# Patient Record
Sex: FEMALE | Race: WHITE | NOT HISPANIC OR LATINO | URBAN - METROPOLITAN AREA
[De-identification: names, ages, dates, MRNs, and addresses within clinical notes are randomized per-mention and may not be internally consistent; named-entity substitution may affect disease eponyms.]

---

## 2017-04-13 ENCOUNTER — OFFICE VISIT (OUTPATIENT)
Dept: ENDOCRINOLOGY | Facility: MEDICAL CENTER | Age: 72
End: 2017-04-13
Payer: MEDICARE

## 2017-04-13 VITALS
HEIGHT: 68 IN | DIASTOLIC BLOOD PRESSURE: 68 MMHG | SYSTOLIC BLOOD PRESSURE: 124 MMHG | WEIGHT: 139.6 LBS | BODY MASS INDEX: 21.16 KG/M2

## 2017-04-13 DIAGNOSIS — E78.2 MIXED HYPERLIPIDEMIA: ICD-10-CM

## 2017-04-13 DIAGNOSIS — E55.9 VITAMIN D INSUFFICIENCY: ICD-10-CM

## 2017-04-13 DIAGNOSIS — I10 ESSENTIAL HYPERTENSION: ICD-10-CM

## 2017-04-13 DIAGNOSIS — E10.65 TYPE 1 DIABETES MELLITUS WITH HYPERGLYCEMIA (HCC): ICD-10-CM

## 2017-04-13 LAB
HBA1C MFR BLD: 7.5 % (ref ?–5.8)
INT CON NEG: NORMAL
INT CON POS: NORMAL

## 2017-04-13 PROCEDURE — 99214 OFFICE O/P EST MOD 30 MIN: CPT | Performed by: INTERNAL MEDICINE

## 2017-04-13 PROCEDURE — 4040F PNEUMOC VAC/ADMIN/RCVD: CPT | Mod: 8P | Performed by: INTERNAL MEDICINE

## 2017-04-13 PROCEDURE — G8420 CALC BMI NORM PARAMETERS: HCPCS | Performed by: INTERNAL MEDICINE

## 2017-04-13 PROCEDURE — 1036F TOBACCO NON-USER: CPT | Performed by: INTERNAL MEDICINE

## 2017-04-13 PROCEDURE — 3014F SCREEN MAMMO DOC REV: CPT | Mod: 8P | Performed by: INTERNAL MEDICINE

## 2017-04-13 PROCEDURE — 1101F PT FALLS ASSESS-DOCD LE1/YR: CPT | Mod: 8P | Performed by: INTERNAL MEDICINE

## 2017-04-13 PROCEDURE — 3045F PR MOST RECENT HEMOGLOBIN A1C LEVEL 7.0-9.0%: CPT | Performed by: INTERNAL MEDICINE

## 2017-04-13 PROCEDURE — 83036 HEMOGLOBIN GLYCOSYLATED A1C: CPT | Performed by: INTERNAL MEDICINE

## 2017-04-13 PROCEDURE — 3017F COLORECTAL CA SCREEN DOC REV: CPT | Mod: 8P | Performed by: INTERNAL MEDICINE

## 2017-04-13 NOTE — MR AVS SNAPSHOT
"        Kymberly Vuong   2017 12:40 PM   Office Visit   MRN: [id number]    Department:  Endocrinology [facility]   Dept Phone:  [phone]    Description:  Female : 1945   Provider:  Gloria Curran M.D.; ENDOCRINOLOGY DIABETES RN           Reason for Visit     Insulin Dependent Diabetes follow up appt.       Allergies as of 2017     No Known Allergies      You were diagnosed with     Type 1 diabetes mellitus with hyperglycemia (CMS-HCC)   [835372]       Mixed hyperlipidemia   [272.2.ICD-9-CM]       Essential hypertension   [8496271]       Vitamin D insufficiency   [957855]         Vital Signs     Blood Pressure Height Weight Body Mass Index Smoking Status       124/68 mmHg 1.715 m (5' 7.5\") 63.322 kg (139 lb 9.6 oz) 21.53 kg/m2 Never Smoker        Basic Information     Date Of Birth Sex Race Ethnicity Preferred Language    1945 Female White Non- English      Your appointments     Aug 24, 2017 12:40 PM   Diabetes Care Visit with Gloria Curran M.D., ENDOCRINOLOGY DIABETES RN   [facility] Endocrin[facility]    [address] [place] [address], [address]  [facility] [address] [phone]           You will be receiving a confirmation call a few days before your appointment from our automated call confirmation system.              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.9   2014 - Present    Encounter for long-term (current) use of insulin (CMS-HCC) Z79.4   2015 - Present    Vitamin D insufficiency E55.9   2016 - Present      Health Maintenance        Date Due Completion Dates    IMM DTaP/Tdap/Td Vaccine (1 - Tdap) 1964 ---    PAP SMEAR 1966 ---    COLONOSCOPY 1995 ---    IMM ZOSTER VACCINE 2005 " ---    IMM PNEUMOCOCCAL 65+ (ADULT) LOW/MEDIUM RISK SERIES (1 of 2 - PCV13) 6/21/2010 ---    MAMMOGRAM 8/8/2014 8/8/2013, 7/31/2013, 7/30/2012, 7/29/2011, 7/28/2010, 7/28/2010, 4/15/2009, 3/25/2009, 3/25/2009    DIABETES MONOFILAMENT / LE EXAM 12/29/2016 12/29/2015 (Done), 8/11/2015 (Done), 8/7/2014, 8/7/2014 (N/S)    Override on 12/29/2015: Done    Override on 8/11/2015: Done    Override on 8/7/2014: (N/S)    RETINAL SCREENING 1/11/2017 1/11/2016, 1/11/2016 (Done), 4/20/2015, 5/15/2014, 7/22/2013, 10/22/2012    Override on 1/11/2016: Done    A1C SCREENING 1/26/2017 7/26/2016, 12/29/2015, 2/11/2015, 8/7/2014, 2/6/2014    FASTING LIPID PROFILE 7/26/2017 7/26/2016, 2/24/2015, 2/18/2014, 6/7/2013    URINE ACR / MICROALBUMIN 7/26/2017 7/26/2016, 2/24/2015, 2/18/2014, 6/7/2013    SERUM CREATININE 7/26/2017 7/26/2016, 2/24/2015, 2/18/2014, 6/7/2013, 4/25/2007, 3/5/2007    BONE DENSITY 7/30/2017 7/30/2012, 7/28/2010            Results     POCT Hemoglobin A1C      Component    Glycohemoglobin    7.5    Comment:     lot [id number]  11/18    Internal Control Negative    Internal Control Positive                        Current Immunizations     No immunizations on file.      Below and/or attached are the medications your provider expects you to take. Review all of your home medications and newly ordered medications with your provider and/or pharmacist. Follow medication instructions as directed by your provider and/or pharmacist. Please keep your medication list with you and share with your provider. Update the information when medications are discontinued, doses are changed, or new medications (including over-the-counter products) are added; and carry medication information at all times in the event of emergency situations     Allergies:  No Known Allergies          Medications  Valid as of: April 13, 2017 -  1:04 PM    Generic Name Brand Name Tablet Size Instructions for use    Aspirin (Tab)  MG Take 325 mg by mouth  every 6 hours as needed.        Calcium Citrate-Vitamin D   Take  by mouth.        Insulin Glargine (Solution Pen-injector) LANTUS 100 UNIT/ML Inject 25 Units as instructed every evening.        Insulin Lispro (Solution Pen-injector) HUMALOG 100 UNIT/ML Inject 5-7 Units as instructed 3 times a day before meals.        Insulin Pen Needle (Misc) Pen Needles 31G X 6 MM Using 4 pen needles per day with insulin injection        Losartan Potassium (Tab) COZAAR 50 MG Take 50 mg by mouth every day.        Misc Natural Products   Take 1 Tab by mouth every day at 6 PM.        Multiple Vitamins-Minerals (Tab) THERAGRAN-M  Take 1 Tab by mouth every day.        Omega-3 Fatty Acids (Cap) OMEGA 3 FA 1000 MG Take 1,000 mg by mouth every day at 6 PM.        Simvastatin (Tab) ZOCOR 20 MG Take 20 mg by mouth every evening.        .                 Medicines prescribed today were sent to:     [id number] - [place], [place] - [address] [place] [address]    Phone: [phone] Fax: [phone]    Open 24 Hours?: No      Medication refill instructions:       If your prescription bottle indicates you have medication refills left, it is not necessary to call your provider’s office. Please contact your pharmacy and they will refill your medication.    If your prescription bottle indicates you do not have any refills left, you may request refills at any time through one of the following ways: The online [employer] system (except Urgent Care), by calling your provider’s office, or by asking your pharmacy to contact your provider’s office with a refill request. Medication refills are processed only during regular business hours and may not be available until the next business day. Your provider may request additional information or to have a follow-up visit with you prior to refilling your medication.   *Please Note: Medication refills are assigned a new Rx number when refilled electronically. Your pharmacy may  indicate that no refills were authorized even though a new prescription for the same medication is available at the pharmacy. Please request the medicine by name with the pharmacy before contacting your provider for a refill.        Your To Do List     Future Labs/Procedures Complete By Expires    COMP METABOLIC PANEL  As directed 4/14/2018    LIPID PROFILE  As directed 4/14/2018    MICROALBUMIN CREAT RATIO URINE  As directed 4/14/2018    VITAMIN D,25 HYDROXY  As directed 4/14/2018         MyChart Access Code: Activation code not generated  Current MyChart Status: Active

## 2017-04-13 NOTE — PROGRESS NOTES
Endocrinology Clinic Progress Note  PCP: Tamie Siddiqi M.D.    CC: Type 1 diabetes     HPI:  Kymberly Vuong is a 71 y.o. old patient who comes in today for routine follow up. Patient is new to me today, previously saw Dr. Moore.    Type 1 diabetes: She was diagnosed with type 1 diabetes at age 24 years. She has been on insulin since then. Currently on Lantus 24 units every morning and Humalog 5-7 units 3 times a day with meals. She checks blood sugars 3-4 times a day. Fasting blood sugar in the morning is mostly in the range of 110-140. No hypoglycemia. Pre-meal blood sugar readings are well controlled, most readings are below 150. She denies any significant issues with numbness and tingling in feet. Last eye exam was done 2 months ago, she does have a history of diabetic retinopathy.    Hypertension: Blood pressure is well controlled. She is on ARB.    Hyperlipidemia: She is currently on simvastatin. No history of coronary artery disease.    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 (Patient taking differently: Inject 24-25 Units as instructed every evening.), Disp: 30 mL, Rfl: 3  •  insulin lispro, Human, (HUMALOG) 100 UNIT/ML Solution Pen-injector injection, Inject  5-7 Units as instructed 3 times a day before meals., Disp: , Rfl:   •  losartan (COZAAR) 50 MG Tab, Take 50 mg by mouth every day., Disp: , Rfl:   •  simvastatin (ZOCOR) 20 MG TABS, Take 20 mg by mouth every evening., Disp: , Rfl:   •  aspirin (ASA) 325 MG TABS, Take 325 mg by mouth every 6 hours as needed., Disp: , Rfl:   •  therapeutic multivitamin-minerals (THERAGRAN-M) TABS, Take 1 Tab by mouth every day., Disp: , Rfl:   •  Misc Natural Products (GLUCOSAMINE CHOND COMPLEX/MSM PO), Take 1 Tab by mouth every day at 6 PM., Disp: , Rfl:   •  docosahexanoic acid (OMEGA 3 FA) 1000 MG CAPS, Take 1,000 mg by mouth every day at 6 PM., Disp: , Rfl:   •  Calcium Carbonate-Vitamin D (CALCIUM + D PO), Take  by mouth., Disp: , Rfl:     Labs:   Results for LIA HAMMONDS (MRN [id number]) as of 4/13/2017 12:54   Ref. Range 7/26/2016 07:53 4/13/2017 12:50   Sodium Latest Ref Range: 134-144 mmol/L 141    Potassium Latest Ref Range: 3.5-5.2 mmol/L 4.5    Chloride Latest Ref Range:  mmol/L 100    Co2 Latest Ref Range: 18-29 mmol/L 25    Glucose Latest Ref Range: 65-99 mg/dL 123 (H)    Bun Latest Ref Range: 8-27 mg/dL 15    Creatinine Latest Ref Range: 0.57-1.00 mg/dL 0.67    GFR If  Latest Ref Range: >59 mL/min/1.73 102    GFR If Non  Latest Ref Range: >59 mL/min/1.73 89    Bun-Creatinine Ratio Latest Ref Range: 11-26  22    Calcium Latest Ref Range: 8.7-10.3 mg/dL 8.7    AST(SGOT) Latest Ref Range: 0-40 IU/L 25    ALT(SGPT) Latest Ref Range: 0-32 IU/L 20    Alkaline Phosphatase Latest Ref Range:  IU/L 76    Total Bilirubin Latest Ref Range: 0.0-1.2 mg/dL 0.4    Albumin Latest Ref Range: 3.5-4.8 g/dL 3.8    Total Protein Latest Ref Range: 6.0-8.5 g/dL 6.5    Globulin Latest Ref Range: 1.5-4.5 g/dL 2.7    A-G Ratio Latest Ref Range: 1.1-2.5  1.4    Glycohemoglobin Unknown 8.6 (H) 7.5   Cholesterol,Tot Latest Ref Range: 100-199 mg/dL 168    Triglycerides Latest Ref Range: 0-149 mg/dL 59   "  HDL Latest Ref Range: >39 mg/dL 69    LDL Latest Ref Range: 0-99 mg/dL 87    VLDL Cholesterol Calc Latest Ref Range: 5-40 mg/dL 12    Comment: Unknown CANCELED    Creatinine, Random Urine Latest Ref Range: Not Estab. mg/dL 113.1    Microalbumin, Urine Random Latest Ref Range: Not Estab. ug/mL 7.6    Microalbumin-Creatinine Latest Ref Range: 0.0-30.0 mg/g creat 6.7      Physical Examination:  Vital signs: /68 mmHg  Ht 1.715 m (5' 7.5\")  Wt 63.322 kg (139 lb 9.6 oz)  BMI 21.53 kg/m2  General: No apparent distress, cooperative  Eyes: No scleral icterus, no discharge, normal eyelids  Neck: No abnormal masses on inspection   Resp: Normal effort, clear to auscultation bilaterally  CVS: Regular rate and rhythm, S1 S2 normal, no murmur  Extremities: No lower extremity edema  Musculoskeletal: Normal digits and nails  Skin: No rash on visible skin  Psych: Alert and oriented, normal mood and affect    Assessment and Plan:    1. Type 1 diabetes mellitus with hyperglycemia (CMS-HCC)  · Hemoglobin A1c today in the clinic is 7.5%  · Goal hemoglobin A1c less than 7.5%  · Continue Lantus 24 units every morning  · Continue Humalog 5-7 units 3 times a day with meals, no changes to medications today  · Repeat labs:  - POCT Hemoglobin A1C  - COMP METABOLIC PANEL; Future  - LIPID PROFILE; Future  - MICROALBUMIN CREAT RATIO URINE; Future    2. Mixed hyperlipidemia  · We will repeat lipid profile  · Continue simvastatin    3. Essential hypertension  · Blood pressure is well controlled  · Continue losartan    4. Vitamin D insufficiency  · Repeat labs:  - VITAMIN D,25 HYDROXY; Future    Return in about 4 months (around 8/13/2017).    Thank you for allowing me to participate in the care of this patient.    Gloria Curran M.D.    CC:   Tamie Siddiqi M.D.    This note was created using voice recognition software (Dragon). The accuracy of the dictation is limited by the abilities of the software. I have reviewed the note prior to " signing, however some errors in grammar and context are still possible. If you have any questions related to this note please do not hesitate to contact our office.

## 2017-04-13 NOTE — Clinical Note
Authorization for Release/Disclosure of Protected Health Information   Name: Kymberly Vuong  : 1945  SSN: [id number]   Address: [place] [address]  Phone:    [phone] (home)     I authorize the entity listed below to release/disclose the PHI below to [facility]/   Liberty Curran MD   Provider or Entity Name:    Dr. Benoit   Address   [facility]   Phone:      Fax:  [phone]     Reason for request: continuity of care   Information to be released:    [  ] Release all info [  ] LAST DEXA   [ xxx ] RETINA EXAM REPORT    [  ] Last Labs      [  ] Check here and initial the line next to each item to release ALL health information INCLUDING  _____ Care and treatment for drug and / or alcohol abuse  _____ HIV testing, infection status, or AIDS  _____ Genetic Testing    DATES OF SERVICE OR TIME PERIOD TO BE DISCLOSED: _2 years __  I understand and acknowledge that:  * This Authorization may be revoked at any time by you in writing, except if your health information has already been used or disclosed.  * Your health information that will be used or disclosed as a result of you signing this authorization could be re-disclosed by the recipient. If this occurs, your re-disclosed health information may no longer be protected by State or Federal laws.  * You may refuse to sign this Authorization. Your refusal will not affect your ability to obtain treatment.  * This Authorization becomes effective upon signing and will  on (date) __________. If no date is indicated, this Authorization will  one (1) year from the signature date.    Name: Kymberly Vuong     Signature:     Date: 2017

## 2017-04-13 NOTE — PROGRESS NOTES
Patient with existing type diabetes:  Type 1 diabetes here for follow up.       Patient's health status since last visit: overall healthy, did catch on virus when on vacation.   Issues with diabetes since last visit none.   Current Diabetes Medications: Lantus 24-25 units at hs and Humalog 5-7 units with meals.     HbA1c: @hba1c@  Lab Results   Component Value Date/Time    GLYCOHEMOGLOBIN 7.5 04/13/2017 12:50 PM        FSBS  Testing: checking 3 times per day.     Hypoglycemia: occasional lows, usually associated with activity.   Exercise: swimming 3 times a week, walking daily .     Retinal Exam:current. Dr. Benoit.     Daily Foot Exam: checks feet daily.     Routine Dental Exams: current.

## 2017-06-19 DIAGNOSIS — E10.65 TYPE 1 DIABETES MELLITUS WITH HYPERGLYCEMIA (HCC): ICD-10-CM

## 2017-08-24 ENCOUNTER — OFFICE VISIT (OUTPATIENT)
Dept: ENDOCRINOLOGY | Facility: MEDICAL CENTER | Age: 72
End: 2017-08-24
Payer: MEDICARE

## 2017-08-24 VITALS
HEIGHT: 68 IN | BODY MASS INDEX: 20.92 KG/M2 | SYSTOLIC BLOOD PRESSURE: 124 MMHG | WEIGHT: 138 LBS | DIASTOLIC BLOOD PRESSURE: 72 MMHG

## 2017-08-24 DIAGNOSIS — E78.2 MIXED HYPERLIPIDEMIA: ICD-10-CM

## 2017-08-24 DIAGNOSIS — I10 ESSENTIAL HYPERTENSION: ICD-10-CM

## 2017-08-24 LAB
HBA1C MFR BLD: 7.7 % (ref ?–5.8)
INT CON NEG: NORMAL
INT CON POS: NORMAL

## 2017-08-24 PROCEDURE — 83036 HEMOGLOBIN GLYCOSYLATED A1C: CPT | Performed by: INTERNAL MEDICINE

## 2017-08-24 PROCEDURE — 99214 OFFICE O/P EST MOD 30 MIN: CPT | Performed by: INTERNAL MEDICINE

## 2017-08-24 NOTE — PROGRESS NOTES
Endocrinology Clinic Progress Note  PCP: Tamie Siddiqi M.D.    CC: Type 1 diabetes     HPI:  Kymberly Vuong is a 71 y.o. old patient who comes in today for routine follow up. Patient is new to me today, previously saw Dr. Moore.    Type 1 diabetes: She is currently on Lantus 24 units every morning and Humalog 5-7 units 3 times a day with meals. She checks blood sugars 3-4 times a day. Fasting blood sugar in the morning is mostly in the range of 100-130. No hypoglycemia. Pre-meal blood sugar readings are well controlled, most readings are below 160. She denies any significant issues with numbness and tingling in feet. She is up-to-date with eye exam.    Hypertension: Blood pressure is well controlled. She is on ARB.    Hyperlipidemia: She is currently on simvastatin. No history of coronary artery disease.    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 (Patient taking differently: Inject 5-6 Units as instructed 3 times a day before meals.), Disp: 30 mL, Rfl: 6  •  Insulin Pen Needle (PEN NEEDLES) 31G X 6 MM Misc, Using 4 pen needles per day with insulin injection, Disp: 100 Each, Rfl: 6  •  insulin glargine (LANTUS SOLOSTAR) 100 UNIT/ML Solution Pen-injector injection, Inject 25 Units as instructed every evening. (Patient taking differently: Inject 24-25 Units as instructed every  evening.), Disp: 30 mL, Rfl: 3  •  losartan (COZAAR) 50 MG Tab, Take 50 mg by mouth every day., Disp: , Rfl:   •  simvastatin (ZOCOR) 20 MG TABS, Take 20 mg by mouth every evening., Disp: , Rfl:   •  aspirin (ASA) 325 MG TABS, Take 325 mg by mouth every 6 hours as needed., Disp: , Rfl:   •  therapeutic multivitamin-minerals (THERAGRAN-M) TABS, Take 1 Tab by mouth every day., Disp: , Rfl:   •  Misc Natural Products (GLUCOSAMINE CHOND COMPLEX/MSM PO), Take 1 Tab by mouth every day at 6 PM., Disp: , Rfl:   •  docosahexanoic acid (OMEGA 3 FA) 1000 MG CAPS, Take 1,000 mg by mouth every day at 6 PM., Disp: , Rfl:   •  Calcium Carbonate-Vitamin D (CALCIUM + D PO), Take  by mouth., Disp: , Rfl:     Labs:   Results for LIA HAMMONDS (MRN [id number]) as of 8/24/2017 12:36   Ref. Range 4/13/2017 12:50 6/12/2017 08:41 8/24/2017 12:33   Sodium Latest Ref Range: 134-144 mmol/L  142    Potassium Latest Ref Range: 3.5-5.2 mmol/L  4.5    Chloride Latest Ref Range:  mmol/L  101    Co2 Latest Ref Range: 18-29 mmol/L  26    Glucose Latest Ref Range: 65-99 mg/dL  115 (H)    Bun Latest Ref Range: 8-27 mg/dL  15    Creatinine Latest Ref Range: 0.57-1.00 mg/dL  0.55 (L)    GFR If  Latest Ref Range: >59 mL/min/1.73  109    GFR If Non  Latest Ref Range: >59 mL/min/1.73  95    Bun-Creatinine Ratio Latest Ref Range: 12-28   27    Calcium Latest Ref Range: 8.7-10.3 mg/dL  9.0    AST(SGOT) Latest Ref Range: 0-40 IU/L  23    ALT(SGPT) Latest Ref Range: 0-32 IU/L  19    Alkaline Phosphatase Latest Ref Range:  IU/L  80    Total Bilirubin Latest Ref Range: 0.0-1.2 mg/dL  0.5    Albumin Latest Ref Range: 3.5-4.8 g/dL  3.9    Total Protein Latest Ref Range: 6.0-8.5 g/dL  6.6    Globulin Latest Ref Range: 1.5-4.5 g/dL  2.7    A-G Ratio Latest Ref Range: 1.2-2.2   1.4    Glycohemoglobin Unknown 7.5  7.7   Cholesterol,Tot Latest Ref Range: 100-199 mg/dL  181    Triglycerides Latest Ref Range: 0-149 mg/dL  77   "  HDL Latest Ref Range: >39 mg/dL  70    LDL Latest Ref Range: 0-99 mg/dL  96    VLDL Cholesterol Calc Latest Ref Range: 5-40 mg/dL  15    Comment: Unknown  CANCELED    Creatinine, Random Urine Latest Ref Range: Not Estab. mg/dL  73.0    Microalbumin, Urine Random Latest Ref Range: Not Estab. ug/mL  6.2    Microalbumin-Creatinine Latest Ref Range: 0.0-30.0 mg/g creat  8.5    25-Hydroxy   Vitamin D 25 Latest Ref Range: 30.0-100.0 ng/mL  39.0        Physical Examination:  Vital signs: /72 mmHg  Ht 1.715 m (5' 7.5\")  Wt 62.596 kg (138 lb)  BMI 21.28 kg/m2  General: No apparent distress, cooperative  Eyes: No scleral icterus, no discharge, normal eyelids  Neck: No abnormal masses on inspection   Resp: Normal effort, clear to auscultation bilaterally  CVS: Regular rate and rhythm, S1 S2 normal, no murmur  Extremities: No lower extremity edema  Musculoskeletal: Normal digits and nails  Skin: No rash on visible skin  Psych: Alert and oriented, normal mood and affect    Assessment and Plan:    1. Type 1 diabetes mellitus with hyperglycemia (CMS-HCC)  · Hemoglobin A1c today in the clinic is 7.7%  · Goal hemoglobin A1c less than 7.5%  · Continue Lantus 24 units every morning  · Continue Humalog 5-7 units 3 times a day with meals, no changes to medications today    2. Mixed hyperlipidemia  · LDL 96  · Continue simvastatin    3. Essential hypertension  · Blood pressure is well controlled  · Continue losartan    Return in about 3 months (around 11/24/2017).    Thank you for allowing me to participate in the care of this patient.    Gloria Curran M.D.    CC:   Tamie Siddiqi M.D.    This note was created using voice recognition software (Dragon). The accuracy of the dictation is limited by the abilities of the software. I have reviewed the note prior to signing, however some errors in grammar and context are still possible. If you have any questions related to this note please do not hesitate to contact our office. "

## 2017-08-24 NOTE — PROGRESS NOTES
Patient with existing type diabetes:  Type 1 diabetes here for follow up     Patient's health status since last visit: having an issue with her eye (blurry vision), this apparently came on suddenly.  Saw opthalmologist and they could not find anything wrong.   Issues with diabetes since last visit none  Current Diabetes Medications: Lantus 24-26 units at hs and Humalog 5-6 units with meals.     HbA1c: @hba1c@  Lab Results   Component Value Date/Time    GLYCOHEMOGLOBIN 7.5 04/13/2017 12:50 PM        FSBS  Testing: testing 4 times per day    Hypoglycemia: occasional  Exercise: has been doing some chair yoga and walking daily.   Retinal Exam:current.     Daily Foot Exam: denies problems.

## 2017-08-24 NOTE — MR AVS SNAPSHOT
"        Kymberly Vuong   2017 12:20 PM   Office Visit   MRN: [id number]    Department:  Endocrinology Med [facility]   Dept Phone:  [phone]    Description:  Female : 1945   Provider:  Dior Culp R.N.; Gloria Curran M.D.           Reason for Visit     Insulin Dependent Diabetes follow up      Allergies as of 2017     No Known Allergies      You were diagnosed with     Uncontrolled type 1 diabetes mellitus with complication (CMS-HCC)   [3836796]       Mixed hyperlipidemia   [272.2.ICD-9-CM]       Essential hypertension   [3498330]         Vital Signs     Blood Pressure Height Weight Body Mass Index Smoking Status       124/72 mmHg 1.715 m (5' 7.5\") 62.596 kg (138 lb) 21.28 kg/m2 Never Smoker        Basic Information     Date Of Birth Sex Race Ethnicity Preferred Language    1945 Female White Non- English      Your appointments     Dec 07, 2017 12:40 PM   Diabetes Care Visit with Gloria Curran M.D., Dior Culp R.N.   [facility]    [address], [address]  [place] [place] [address] [phone]           You will be receiving a confirmation call a few days before your appointment from our automated call confirmation system.              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.9   2014 - Present    Encounter for long-term (current) use of insulin (CMS-HCC) Z79.4   2015 - Present    Vitamin D insufficiency E55.9   2016 - Present      Health Maintenance        Date Due Completion Dates    IMM DTaP/Tdap/Td Vaccine (1 - Tdap) 1964 ---    PAP SMEAR 1966 ---    COLONOSCOPY 1995 ---    IMM ZOSTER VACCINE 2005 ---    IMM PNEUMOCOCCAL 65+ (ADULT) LOW/MEDIUM RISK " SERIES (1 of 2 - PCV13) 6/21/2010 ---    MAMMOGRAM 8/8/2014 8/8/2013, 7/31/2013, 7/30/2012, 7/29/2011, 7/28/2010, 7/28/2010, 4/15/2009, 3/25/2009, 3/25/2009    DIABETES MONOFILAMENT / LE EXAM 12/29/2016 12/29/2015 (Done), 8/11/2015 (Done), 8/7/2014, 8/7/2014 (N/S)    Override on 12/29/2015: Done    Override on 8/11/2015: Done    Override on 8/7/2014: (N/S)    BONE DENSITY 7/30/2017 7/30/2012, 7/28/2010    IMM INFLUENZA (1) 9/1/2017 ---    A1C SCREENING 10/13/2017 4/13/2017, 7/26/2016, 12/29/2015, 2/11/2015, 8/7/2014, 2/6/2014    RETINAL SCREENING 3/20/2018 3/20/2017 (Done), 1/11/2016, 1/11/2016 (Done), 4/20/2015, 5/15/2014, 7/22/2013, 10/22/2012    Override on 3/20/2017: Done    Override on 1/11/2016: Done    FASTING LIPID PROFILE 6/12/2018 6/12/2017, 7/26/2016, 2/24/2015, 2/18/2014, 6/7/2013    URINE ACR / MICROALBUMIN 6/12/2018 6/12/2017, 7/26/2016, 2/24/2015, 2/18/2014, 6/7/2013    SERUM CREATININE 6/12/2018 6/12/2017, 7/26/2016, 2/24/2015, 2/18/2014, 6/7/2013, 4/25/2007, 3/5/2007            Results     POCT Hemoglobin A1C      Component    Glycohemoglobin    7.7    Comment:     lot [id number]  4/19    Internal Control Negative    Internal Control Positive                        Current Immunizations     No immunizations on file.      Below and/or attached are the medications your provider expects you to take. Review all of your home medications and newly ordered medications with your provider and/or pharmacist. Follow medication instructions as directed by your provider and/or pharmacist. Please keep your medication list with you and share with your provider. Update the information when medications are discontinued, doses are changed, or new medications (including over-the-counter products) are added; and carry medication information at all times in the event of emergency situations     Allergies:  No Known Allergies          Medications  Valid as of: August 24, 2017 - 12:46 PM    Generic Name Brand Name Tablet  Size Instructions for use    Aspirin (Tab)  MG Take 325 mg by mouth every 6 hours as needed.        Calcium Citrate-Vitamin D   Take  by mouth.        Insulin Glargine (Solution Pen-injector) LANTUS 100 UNIT/ML Inject 25 Units as instructed every evening.        Insulin Lispro (Solution Pen-injector) HUMALOG 100 UNIT/ML Inject 10 Units as instructed 3 times a day before meals.        Insulin Pen Needle (Misc) Pen Needles 31G X 6 MM Using 4 pen needles per day with insulin injection        Losartan Potassium (Tab) COZAAR 50 MG Take 50 mg by mouth every day.        Misc Natural Products   Take 1 Tab by mouth every day at 6 PM.        Multiple Vitamins-Minerals (Tab) THERAGRAN-M  Take 1 Tab by mouth every day.        Omega-3 Fatty Acids (Cap) OMEGA 3 FA 1000 MG Take 1,000 mg by mouth every day at 6 PM.        Simvastatin (Tab) ZOCOR 20 MG Take 20 mg by mouth every evening.        .                 Medicines prescribed today were sent to:     [id number] - [place], [place] - [address] [address]    Phone: [phone] Fax: [phone]    Open 24 Hours?: No      Medication refill instructions:       If your prescription bottle indicates you have medication refills left, it is not necessary to call your provider’s office. Please contact your pharmacy and they will refill your medication.    If your prescription bottle indicates you do not have any refills left, you may request refills at any time through one of the following ways: The online [employer] system (except Urgent Care), by calling your provider’s office, or by asking your pharmacy to contact your provider’s office with a refill request. Medication refills are processed only during regular business hours and may not be available until the next business day. Your provider may request additional information or to have a follow-up visit with you prior to refilling your medication.   *Please Note: Medication refills are  assigned a new Rx number when refilled electronically. Your pharmacy may indicate that no refills were authorized even though a new prescription for the same medication is available at the pharmacy. Please request the medicine by name with the pharmacy before contacting your provider for a refill.           MyChart Access Code: Activation code not generated  Current [employer] Status: Active

## 2017-12-07 ENCOUNTER — APPOINTMENT (OUTPATIENT)
Dept: ENDOCRINOLOGY | Facility: MEDICAL CENTER | Age: 72
End: 2017-12-07
Payer: MEDICARE

## 2018-01-12 ENCOUNTER — OFFICE VISIT (OUTPATIENT)
Dept: ENDOCRINOLOGY | Facility: MEDICAL CENTER | Age: 73
End: 2018-01-12
Payer: MEDICARE

## 2018-01-12 VITALS
DIASTOLIC BLOOD PRESSURE: 70 MMHG | HEART RATE: 95 BPM | OXYGEN SATURATION: 97 % | BODY MASS INDEX: 21.54 KG/M2 | SYSTOLIC BLOOD PRESSURE: 118 MMHG | HEIGHT: 68 IN | WEIGHT: 142.1 LBS

## 2018-01-12 DIAGNOSIS — E78.2 MIXED HYPERLIPIDEMIA: ICD-10-CM

## 2018-01-12 DIAGNOSIS — E10.65 TYPE 1 DIABETES MELLITUS WITH HYPERGLYCEMIA (HCC): ICD-10-CM

## 2018-01-12 DIAGNOSIS — I10 ESSENTIAL HYPERTENSION: ICD-10-CM

## 2018-01-12 LAB
HBA1C MFR BLD: 6.7 % (ref ?–5.8)
INT CON NEG: NORMAL
INT CON POS: NORMAL

## 2018-01-12 PROCEDURE — 99214 OFFICE O/P EST MOD 30 MIN: CPT | Performed by: INTERNAL MEDICINE

## 2018-01-12 PROCEDURE — 83036 HEMOGLOBIN GLYCOSYLATED A1C: CPT | Performed by: INTERNAL MEDICINE

## 2018-01-12 NOTE — PROGRESS NOTES
"Endocrinology Clinic Progress Note  PCP: Tamie Siddiqi M.D.    CC: Type 1 diabetes     HPI:  Kymberly Vuong is a 71 y.o. old patient who comes in today for routine follow up.     Type 1 diabetes: She is currently on Lantus 25 units every morning and Humalog one unit for every 10 g of carbs plus low-dose correction factor, usually she takes 4-6 units with meals. She checks blood sugars 4 times a day. Fasting blood sugars mostly below 140. No hypoglycemia. Pre-meal blood sugar readings are well controlled as well. She is up-to-date with eye exam.    Hypertension: Blood pressure is well controlled. She is on ARB.    Hyperlipidemia: She is currently on simvastatin, tolerating well. No history of coronary artery disease.    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 Type 1 diabetes mellitus with hyperglycemia (CMS-HCC)  · Hemoglobin A1c today in the clinic is 6.7%  · Goal hemoglobin A1c less than 7.5%  · Continue Lantus 25 units every morning  · Continue Humalog one unit per 10 g of carbs plus low-dose correction factor, no changes to medications today    2. Mixed " hyperlipidemia  · LDL 96  · Continue simvastatin    3. Essential hypertension  · Blood pressure is well controlled  · Continue losartan    Return in about 3 months (around 4/12/2018).    Thank you for allowing me to participate in the care of this patient.    Gloria Curran M.D.    CC:   Tamie Siddiqi M.D.    This note was created using voice recognition software (Dragon). The accuracy of the dictation is limited by the abilities of the software. I have reviewed the note prior to signing, however some errors in grammar and context are still possible. If you have any questions related to this note please do not hesitate to contact our office.

## 2018-01-12 NOTE — PROGRESS NOTES
Patient with existing type diabetes:  Type 1 diabetes, uncontrolled.  Here for follow up.      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  States her eyesight is much better.   Issues with diabetes since last visit none.    Current Diabetes Medications: Lantus 25 units in the morning and Humalog 4-6 units with meals ( using a 1:10 cho ratio and adding 1 additional unit if blood sugars in the 150 range.      HbA1c: @hba1c@  Lab Results   Component Value Date/Time    HBA1C 6.7 01/12/2018 11:26 AM        FSBS  Testing: testing 4-5 times per day    Hypoglycemia: rare.   Exercise: doing yoga and swimming     Retinal Exam:current, eye surgery in December.     Daily Foot Exam: checks feet denies problems.   Foot Exam:  Monofilament: done  Monofilament testing with a 10 gram force: sensation intact: intact bilaterally  Visual Inspection: Feet without maceration, ulcers, fissures.  Pedal pulses: intact bilaterally      Flu vaccine: states current.   Pneumonia vaccine current.

## 2018-04-05 LAB
ALBUMIN/CREAT UR: 26.9 MG/G CREAT (ref 0–30)
BUN SERPL-MCNC: 18 MG/DL (ref 8–27)
BUN/CREAT SERPL: 29 (ref 12–28)
CALCIUM SERPL-MCNC: 9 MG/DL (ref 8.7–10.3)
CHLORIDE SERPL-SCNC: 97 MMOL/L (ref 96–106)
CHOLEST SERPL-MCNC: 186 MG/DL (ref 100–199)
CO2 SERPL-SCNC: 24 MMOL/L (ref 18–29)
CREAT SERPL-MCNC: 0.63 MG/DL (ref 0.57–1)
CREAT UR-MCNC: 40.1 MG/DL
GFR SERPLBLD CREATININE-BSD FMLA CKD-EPI: 104 ML/MIN/1.73
GFR SERPLBLD CREATININE-BSD FMLA CKD-EPI: 90 ML/MIN/1.73
GLUCOSE SERPL-MCNC: 140 MG/DL (ref 65–99)
HDLC SERPL-MCNC: 62 MG/DL
LABORATORY COMMENT REPORT: ABNORMAL
LDLC SERPL CALC-MCNC: 104 MG/DL (ref 0–99)
MICROALBUMIN UR-MCNC: 10.8 UG/ML
POTASSIUM SERPL-SCNC: 4.6 MMOL/L (ref 3.5–5.2)
SODIUM SERPL-SCNC: 138 MMOL/L (ref 134–144)
TRIGL SERPL-MCNC: 102 MG/DL (ref 0–149)
TSH SERPL DL<=0.005 MIU/L-ACNC: 0.73 UIU/ML (ref 0.45–4.5)
VLDLC SERPL CALC-MCNC: 20 MG/DL (ref 5–40)

## 2018-04-13 ENCOUNTER — OFFICE VISIT (OUTPATIENT)
Dept: ENDOCRINOLOGY | Facility: MEDICAL CENTER | Age: 73
End: 2018-04-13
Payer: MEDICARE

## 2018-04-13 VITALS
SYSTOLIC BLOOD PRESSURE: 122 MMHG | OXYGEN SATURATION: 92 % | WEIGHT: 141.4 LBS | BODY MASS INDEX: 21.43 KG/M2 | DIASTOLIC BLOOD PRESSURE: 62 MMHG | HEIGHT: 68 IN | HEART RATE: 101 BPM

## 2018-04-13 DIAGNOSIS — I10 ESSENTIAL HYPERTENSION: ICD-10-CM

## 2018-04-13 DIAGNOSIS — E78.2 MIXED HYPERLIPIDEMIA: ICD-10-CM

## 2018-04-13 DIAGNOSIS — E55.9 VITAMIN D INSUFFICIENCY: ICD-10-CM

## 2018-04-13 LAB
HBA1C MFR BLD: 7.4 % (ref ?–5.8)
INT CON NEG: NORMAL
INT CON POS: NORMAL

## 2018-04-13 PROCEDURE — 83036 HEMOGLOBIN GLYCOSYLATED A1C: CPT | Performed by: INTERNAL MEDICINE

## 2018-04-13 PROCEDURE — 99214 OFFICE O/P EST MOD 30 MIN: CPT | Performed by: INTERNAL MEDICINE

## 2018-04-13 NOTE — PROGRESS NOTES
"Endocrinology Clinic Progress Note  PCP: Tamie Siddiqi M.D.    CC: Type 1 diabetes     HPI:  Kymberly Vuong is a 71 y.o. old patient who comes in today for routine follow up.     Type 1 diabetes: She is currently on Lantus 25 units every morning and Humalog one unit for every 10 g of carbs plus low-dose correction factor, usually she takes 4-6 units with meals. She checks blood sugars 4 times a day. She was on vacation for several weeks blood sugars were slightly higher than goal. Blood sugar control is improved since returning home. No hypoglycemia. She denies numbness or tingling in feet. She is up-to-date with eye exam.    Hypertension: Blood pressure is well controlled. She is on ARB.    Hyperlipidemia: She is currently on simvastatin, tolerating well.     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 Type 1 diabetes mellitus with hyperglycemia (CMS-HCC)  · Hemoglobin A1c today in the clinic is 7.4%  · Goal hemoglobin A1c " less than 7.5%  · Continue Lantus 25 units every morning and Humalog one unit per 10 g of carbs plus low-dose correction factor, no changes to medications today    2. Mixed hyperlipidemia  ·   · Continue simvastatin    3. Essential hypertension  · Blood pressure is well controlled  · Continue losartan    Return in about 3 months (around 7/13/2018).    Thank you for allowing me to participate in the care of this patient.    Gloria Curran M.D.    CC:   Tamie Siddiqi M.D.    This note was created using voice recognition software (Dragon). The accuracy of the dictation is limited by the abilities of the software. I have reviewed the note prior to signing, however some errors in grammar and context are still possible. If you have any questions related to this note please do not hesitate to contact our office.

## 2018-04-13 NOTE — PROGRESS NOTES
"RN-CDE Note    Subjective:     Health changes since last visit/interval Hx: None    Medications (including changes made today)  Current Outpatient Prescriptions   Medication Sig Dispense Refill   • Nutritional Supplements (VITAMIN D MAINTENANCE PO) Take  by mouth.     • insulin glargine (LANTUS SOLOSTAR) 100 UNIT/ML Solution Pen-injector injection Inject 24-30 Units as instructed every evening. (Patient taking differently: Inject 25 Units as instructed every day.) 30 mL 6   • insulin lispro, Human, (HUMALOG) 100 UNIT/ML Solution Pen-injector injection Inject 10 Units as instructed 3 times a day before meals. (Patient taking differently: Inject 4-6 Units as instructed 3 times a day before meals. Using a 1:5 cho ratio) 30 mL 6   • losartan (COZAAR) 50 MG Tab Take 50 mg by mouth every day.     • simvastatin (ZOCOR) 20 MG TABS Take 20 mg by mouth every evening.     • aspirin (ASA) 325 MG TABS Take 325 mg by mouth every 6 hours as needed.     • therapeutic multivitamin-minerals (THERAGRAN-M) TABS Take 1 Tab by mouth every day.     • Misc Natural Products (GLUCOSAMINE CHOND COMPLEX/MSM PO) Take 1 Tab by mouth every day at 6 PM.     • docosahexanoic acid (OMEGA 3 FA) 1000 MG CAPS Take 1,000 mg by mouth every day at 6 PM.     • Insulin Pen Needle (PEN NEEDLES) 31G X 6 MM Misc Using 4 pen needles per day with insulin injection 100 Each 6     No current facility-administered medications for this visit.        Taking daily ASA: No  Taking above medications as prescribed: Yes  Patient Denies side effects of medication.    Exercise: yoga and swimming several times a week  Diet: \"healthy\" diet  in general  Patient's body mass index is 21.82 kg/m². Exercise and nutrition counseling were performed at this visit.    Health Maintenance:   Health Maintenance Topics with due status: Overdue       Topic Date Due    Annual Wellness Visit 1945    IMM DTaP/Tdap/Td Vaccine 06/21/1964    PAP SMEAR 06/21/1966    COLONOSCOPY 06/21/1995 "    IMM PNEUMOCOCCAL 65+ (ADULT) LOW/MEDIUM RISK SERIES 06/21/2010    MAMMOGRAM 08/08/2014    BONE DENSITY 07/30/2017    RETINAL SCREENING 03/20/2018         DM:   Last A1c:   Lab Results   Component Value Date/Time    HBA1C 7.4 04/13/2018 10:44 AM      A1c goal: < 7    Glucose monitoring frequency: 4-5 times per day      Hypoglycemic episodes: yes - rare, usually if has increased activity     Last Retinal Exam: completed in February Provider: [facility]  Daily Foot Exam: yes  Routine Dental Exams: yes    Lab Results   Component Value Date/Time    MICROALBCALC 8.5 06/12/2017 08:41 AM    MICROALBCALC 89.8 06/07/2013    MICRALB 10.8 04/04/2018 08:53 AM        ACR Albumin/Creatinine Ratio goal <30 at goal        HTN:   Blood pressure goal <140/<80 at goal.   Currently Rx ACE/ARB: Yes    Dyslipidemia:    Lab Results   Component Value Date/Time    CHOLSTRLTOT 186 04/04/2018 08:53 AM    CHOLSTRLTOT 169 06/07/2013     (H) 04/04/2018 08:53 AM    LDL 88 06/07/2013    HDL 62 04/04/2018 08:53 AM    HDL 57 06/07/2013    TRIGLYCERIDE 102 04/04/2018 08:53 AM    TRIGLYCERIDE 120 06/07/2013       Lab Results   Component Value Date/Time    SODIUM 138 04/04/2018 08:53 AM    SODIUM 132 (L) 04/25/2007 10:56 AM    POTASSIUM 4.6 04/04/2018 08:53 AM    POTASSIUM 3.3 (L) 04/25/2007 10:56 AM    CHLORIDE 97 04/04/2018 08:53 AM    CHLORIDE 98 04/25/2007 10:56 AM    CO2 24 04/04/2018 08:53 AM    CO2 26 04/25/2007 10:56 AM    GLUCOSE 140 (H) 04/04/2018 08:53 AM    GLUCOSE 211 (H) 04/25/2007 10:56 AM    BUN 18 04/04/2018 08:53 AM    BUN 13 04/25/2007 10:56 AM    CREATININE 0.63 04/04/2018 08:53 AM    CREATININE 0.62 06/07/2013    BUNCREATRAT 29 (H) 04/04/2018 08:53 AM     Lab Results   Component Value Date/Time    ALKPHOSPHAT 80 06/12/2017 08:41 AM    ASTSGOT 23 06/12/2017 08:41 AM    ALTSGPT 19 06/12/2017 08:41 AM    TBILIRUBIN 0.5 06/12/2017 08:41 AM        Currently Rx Statin: Yes      She  reports that she has never  smoked. She has never used smokeless tobacco.    Objective:     Exam:  Monofilament: not done        Plan:     Discussed All medications, side effects and compliance (discussed carefully)  Annual eye examinations at Ophthalmology  Diabetic diet discussed in detail, written exchange diet given  Foot care discussed and Podiatry visits  Glycohemoglobin and other lab monitoring  Home glucose monitoring emphasized.

## 2018-04-13 NOTE — LETTER
Authorization for Release/Disclosure of Protected Health Information   Name: Kymberly Vuong  : 1945  SSN: [id number]   Address: [place] [address]  Phone:    [phone] (home)     I authorize the entity listed below to release/disclose the PHI below to [facility]/   Liberty Curran MD   Provider or Entity Name:    [facility]   Address   City, [facility], [facility]   Phone:    [phone]    Fax:     Reason for request: continuity of care   Information to be released:    [  ] Release all info [  ] LAST DEXA   [xxx  ] RETINA EXAM REPORT    [  ] Last Labs      [  ] Check here and initial the line next to each item to release ALL health information INCLUDING  _____ Care and treatment for drug and / or alcohol abuse  _____ HIV testing, infection status, or AIDS  _____ Genetic Testing    DATES OF SERVICE OR TIME PERIOD TO BE DISCLOSED: _2 years __  I understand and acknowledge that:  * This Authorization may be revoked at any time by you in writing, except if your health information has already been used or disclosed.  * Your health information that will be used or disclosed as a result of you signing this authorization could be re-disclosed by the recipient. If this occurs, your re-disclosed health information may no longer be protected by State or Federal laws.  * You may refuse to sign this Authorization. Your refusal will not affect your ability to obtain treatment.  * This Authorization becomes effective upon signing and will  on (date) __________. If no date is indicated, this Authorization will  one (1) year from the signature date.    Name: Kymberly Vuong     Signature:     Date: 2018

## 2018-10-19 ENCOUNTER — OFFICE VISIT (OUTPATIENT)
Dept: ENDOCRINOLOGY | Facility: MEDICAL CENTER | Age: 73
End: 2018-10-19
Payer: MEDICARE

## 2018-10-19 VITALS
DIASTOLIC BLOOD PRESSURE: 78 MMHG | BODY MASS INDEX: 22.16 KG/M2 | SYSTOLIC BLOOD PRESSURE: 108 MMHG | OXYGEN SATURATION: 97 % | HEART RATE: 101 BPM | HEIGHT: 68 IN | WEIGHT: 146.2 LBS

## 2018-10-19 DIAGNOSIS — E78.2 MIXED HYPERLIPIDEMIA: ICD-10-CM

## 2018-10-19 DIAGNOSIS — E10.65 UNCONTROLLED TYPE 1 DIABETES MELLITUS WITH HYPERGLYCEMIA (HCC): ICD-10-CM

## 2018-10-19 DIAGNOSIS — E10.65 TYPE 1 DIABETES MELLITUS WITH HYPERGLYCEMIA (HCC): ICD-10-CM

## 2018-10-19 DIAGNOSIS — I10 ESSENTIAL HYPERTENSION: ICD-10-CM

## 2018-10-19 LAB
HBA1C MFR BLD: 8.2 % (ref ?–5.8)
INT CON NEG: NORMAL
INT CON POS: NORMAL

## 2018-10-19 PROCEDURE — 99214 OFFICE O/P EST MOD 30 MIN: CPT | Performed by: INTERNAL MEDICINE

## 2018-10-19 PROCEDURE — 83036 HEMOGLOBIN GLYCOSYLATED A1C: CPT | Performed by: INTERNAL MEDICINE

## 2018-10-19 RX ORDER — PEN NEEDLE, DIABETIC 31 G X1/4"
NEEDLE, DISPOSABLE MISCELLANEOUS
Qty: 300 EACH | Refills: 3 | Status: SHIPPED | OUTPATIENT
Start: 2018-10-19

## 2018-10-19 NOTE — LETTER
[employer] [facility]    Fax: [phone]   Authorization for Release/Disclosure of   Protected Health Information   Name: KYMBERLY HAMMONDS : 1945 SSN: [id number]   Address: [place] [address] Phone:    [phone] (home)    I authorize the entity listed below to release/disclose the PHI below to:   [facility]/ Gloria Curran M.D.   Provider or Entity Name:  [facility]   Address   City, [facility], [facility]   Phone:      Fax:  [phone]   Reason for request: continuity of care   Information to be released:    [  ] LAST COLONOSCOPY,  including any PATH REPORT and follow-up  [  ] LAST FIT/COLOGUARD RESULT [  ] LAST DEXA  [  ] LAST MAMMOGRAM  [  ] LAST PAP  [  ] LAST LABS [  ] RETINA EXAM REPORT  [  ] IMMUNIZATION RECORDS  [  ] Release all info      [  ] Check here and initial the line next to each item to release ALL health information INCLUDING  _____ Care and treatment for drug and / or alcohol abuse  _____ HIV testing, infection status, or AIDS  _____ Genetic Testing    DATES OF SERVICE OR TIME PERIOD TO BE DISCLOSED: _____________  I understand and acknowledge that:  * This Authorization may be revoked at any time by you in writing, except if your health information has already been used or disclosed.  * Your health information that will be used or disclosed as a result of you signing this authorization could be re-disclosed by the recipient. If this occurs, your re-disclosed health information may no longer be protected by State or Federal laws.  * You may refuse to sign this Authorization. Your refusal will not affect your ability to obtain treatment.  * This Authorization becomes effective upon signing and will  on (date) __________.      If no date is indicated, this Authorization will  one (1) year from the signature date.    Name: Kymberly Hammonds    Signature:   Date:     10/19/2018       PLEASE FAX REQUESTED RECORDS BACK TO: [phone]

## 2018-10-19 NOTE — PROGRESS NOTES
"RN-CDE Note  Type 1 diabetes, uncontrolled, here for follow up  Subjective:     Health changes since last visit/interval Hx: None    Medications (including changes made today)  Current Outpatient Prescriptions   Medication Sig Dispense Refill   • LANTUS SOLOSTAR 100 UNIT/ML Solution Pen-injector injection INJECT 24-30 UNITS AS INSTRUCTED EVERY EVENING. 30 mL 5   • Nutritional Supplements (VITAMIN D MAINTENANCE PO) Take  by mouth.     • insulin lispro, Human, (HUMALOG) 100 UNIT/ML Solution Pen-injector injection Inject 10 Units as instructed 3 times a day before meals. (Patient taking differently: Inject 4-6 Units as instructed 3 times a day before meals. Using a 1:5 cho ratio) 30 mL 6   • losartan (COZAAR) 50 MG Tab Take 50 mg by mouth every day.     • simvastatin (ZOCOR) 20 MG TABS Take 20 mg by mouth every evening.     • aspirin (ASA) 325 MG TABS Take 325 mg by mouth every 6 hours as needed.     • therapeutic multivitamin-minerals (THERAGRAN-M) TABS Take 1 Tab by mouth every day.     • Misc Natural Products (GLUCOSAMINE CHOND COMPLEX/MSM PO) Take 1 Tab by mouth every day at 6 PM.     • docosahexanoic acid (OMEGA 3 FA) 1000 MG CAPS Take 1,000 mg by mouth every day at 6 PM.     • Insulin Pen Needle (PEN NEEDLES) 31G X 6 MM Misc Using 4 pen needles per day with insulin injection 100 Each 6     No current facility-administered medications for this visit.        Taking daily ASA: No  Taking above medications as prescribed: yes  SIDE EFFECTS: Patient denies side effects to medications    Exercise: walking daily and doing yoga 4 times a week.   Diet: \"healthy\" diet  in general  Has been eating out more than usual the past several weeks.   Patient's body mass index is 22.56 kg/m². Exercise and nutrition counseling were performed at this visit.      Health Maintenance:   Health Maintenance Due   Topic Date Due   • Annual Wellness Visit  1945   • IMM HEP B VACCINE (1 of 3 - Risk 3-dose series) 06/21/1964   • IMM " DTaP/Tdap/Td Vaccine (1 - Tdap) 06/21/1964   • PAP SMEAR  06/21/1966   • COLONOSCOPY  06/21/1995   • IMM ZOSTER VACCINES (1 of 2) 06/21/1995   • IMM PNEUMOCOCCAL 65+ (ADULT) LOW/MEDIUM RISK SERIES (1 of 2 - PCV13) 06/21/2010   • MAMMOGRAM  08/08/2014   • BONE DENSITY  07/30/2017   • RETINAL SCREENING  03/20/2018   • IMM INFLUENZA (1) 09/01/2018   • A1C SCREENING  10/13/2018       Im    DM:   Last A1c:   Lab Results   Component Value Date/Time    HBA1C 8.2 10/19/2018 09:29 AM      A1C GOAL: < 7.5    Glucose monitoring frequency: testing bid  Breakfast: 150-250 range and Dinner: 105-155 range  Hypoglycemic episodes: yes - on occasion in the middle of the night.     Last Retinal Exam: states current3 Provider: [facility] WILL FAX REQUEST FOR LABS.   Daily Foot Exam: Yes denies problems.   Routine Dental Exams: Yes    Lab Results   Component Value Date/Time    MICROALBCALC 8.5 06/12/2017 08:41 AM    MICROALBCALC 89.8 06/07/2013    MICRALB 10.8 04/04/2018 08:53 AM        ACR Albumin/Creatinine Ratio goal <30     HTN:   Blood pressure goal <140/<80 .   Currently Rx ACE/ARB: Yes    Dyslipidemia:    Lab Results   Component Value Date/Time    CHOLSTRLTOT 186 04/04/2018 08:53 AM    CHOLSTRLTOT 169 06/07/2013     (H) 04/04/2018 08:53 AM    LDL 88 06/07/2013    HDL 62 04/04/2018 08:53 AM    HDL 57 06/07/2013    TRIGLYCERIDE 102 04/04/2018 08:53 AM    TRIGLYCERIDE 120 06/07/2013       Lab Results   Component Value Date/Time    SODIUM 138 04/04/2018 08:53 AM    SODIUM 132 (L) 04/25/2007 10:56 AM    POTASSIUM 4.6 04/04/2018 08:53 AM    POTASSIUM 3.3 (L) 04/25/2007 10:56 AM    CHLORIDE 97 04/04/2018 08:53 AM    CHLORIDE 98 04/25/2007 10:56 AM    CO2 24 04/04/2018 08:53 AM    CO2 26 04/25/2007 10:56 AM    GLUCOSE 140 (H) 04/04/2018 08:53 AM    GLUCOSE 211 (H) 04/25/2007 10:56 AM    BUN 18 04/04/2018 08:53 AM    BUN 13 04/25/2007 10:56 AM    CREATININE 0.63 04/04/2018 08:53 AM    CREATININE 0.62 06/07/2013     BUNCREATRAT 29 (H) 04/04/2018 08:53 AM     Lab Results   Component Value Date/Time    ALKPHOSPHAT 80 06/12/2017 08:41 AM    ASTSGOT 23 06/12/2017 08:41 AM    ALTSGPT 19 06/12/2017 08:41 AM    TBILIRUBIN 0.5 06/12/2017 08:41 AM        Currently Rx Statin: Yes    She  reports that she has never smoked. She has never used smokeless tobacco.    Objective:     Exam:  Monofilament: done   Monofilament testing with a 10 gram force: sensation intact: intact bilaterally  Visual Inspection: Feet without maceration, ulcers, fissures.  Pedal pulses: intact bilaterally    Plan:     Discussed and educated on:   - All medications, side effects and compliance (discussed carefully)  - Annual eye examinations at Ophthalmology  - HbA1C: target  - Home glucose monitoring emphasized  - Weight control and daily exercise

## 2018-10-19 NOTE — PROGRESS NOTES
"Endocrinology Clinic Progress Note  PCP: Tamie Siddiqi M.D.    CC: Type 1 diabetes     HPI:  Kymberly Vuong is a 71 y.o. old patient who comes in today for routine follow up.     Type 1 diabetes: She is currently on Lantus 25 units every morning and Humalog one unit for every 10 g of carbs plus low-dose correction factor. She checks blood sugars 4 times a day.  Recently her blood sugars during the day have been higher than usual, she has been eating out a lot.  Fasting blood sugars are well controlled.  Denies hypoglycemia. She denies numbness or tingling in feet. She is up-to-date with eye exam.    Hypertension: Blood pressure is well controlled. She is on ARB.    Hyperlipidemia: She is currently on simvastatin, tolerating well.     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 Type 1 diabetes mellitus with hyperglycemia ([facility])  · Hemoglobin A1c today in the clinic is 8.2%, up from 7.4% at last clinic visit  · Goal hemoglobin A1c less than 7.5%  · We discussed about diet and lots of modification, for now she will continue Lantus 25 units " every morning and Humalog one unit per 10 g of carbs plus low-dose correction factor    2. Mixed hyperlipidemia  ·   · Continue simvastatin    3. Essential hypertension  · Blood pressure is well controlled  · Continue losartan    Return in about 3 months (around 1/19/2019).    Thank you for allowing me to participate in the care of this patient.    Gloria Curran M.D.    CC:   Tamie Siddiqi M.D.    This note was created using voice recognition software (Dragon). The accuracy of the dictation is limited by the abilities of the software. I have reviewed the note prior to signing, however some errors in grammar and context are still possible. If you have any questions related to this note please do not hesitate to contact our office.

## 2019-01-25 ENCOUNTER — APPOINTMENT (OUTPATIENT)
Dept: ENDOCRINOLOGY | Facility: MEDICAL CENTER | Age: 74
End: 2019-01-25
Payer: MEDICARE

## 2019-03-15 ENCOUNTER — OFFICE VISIT (OUTPATIENT)
Dept: ENDOCRINOLOGY | Facility: MEDICAL CENTER | Age: 74
End: 2019-03-15
Payer: MEDICARE

## 2019-03-15 VITALS
DIASTOLIC BLOOD PRESSURE: 70 MMHG | BODY MASS INDEX: 21.98 KG/M2 | SYSTOLIC BLOOD PRESSURE: 128 MMHG | WEIGHT: 145 LBS | HEART RATE: 97 BPM | HEIGHT: 68 IN | OXYGEN SATURATION: 96 %

## 2019-03-15 DIAGNOSIS — Z79.4 ENCOUNTER FOR LONG-TERM (CURRENT) USE OF INSULIN (HCC): ICD-10-CM

## 2019-03-15 DIAGNOSIS — E10.649 UNCONTROLLED TYPE 1 DIABETES MELLITUS WITH HYPOGLYCEMIA WITHOUT COMA (HCC): ICD-10-CM

## 2019-03-15 DIAGNOSIS — I10 ESSENTIAL HYPERTENSION: ICD-10-CM

## 2019-03-15 LAB
HBA1C MFR BLD: 8.4 % (ref ?–5.8)
INT CON NEG: NEGATIVE
INT CON POS: POSITIVE

## 2019-03-15 PROCEDURE — 99214 OFFICE O/P EST MOD 30 MIN: CPT | Performed by: PHYSICIAN ASSISTANT

## 2019-03-15 PROCEDURE — 83036 HEMOGLOBIN GLYCOSYLATED A1C: CPT | Performed by: PHYSICIAN ASSISTANT

## 2019-03-15 NOTE — PROGRESS NOTES
New Patient Consult Note  Referred by: Tamie Siddiqi M.D.    Reason for consult: Diabetes Management Type 1    HPI:  Kymberly Vuong is a 73 y.o. old patient who is seeing us today for diabetes care.  This is a pleasant patient with diabetes and I appreciate the opportunity to participate in the care of this patient.  This is a new patient with me today.    Labs of 3/15/19 HbA1c is 8.4  Labs of 10/19/18 HbA1c is 8.2  Labs of 4/13/18 HbA1c is 7.4    BG Diary:3/15/2019  In the AM:  No log     Has been Diabetic since 1969  Has a Glucagon pen at home: no    1. Uncontrolled type 1 diabetes mellitus with hypoglycemia without coma (HCC)    This is a new patient with me on 3/15/2019  They are on:  1.  Lantus  26 units in the AM  2.  Humalog   Carb ratio 1:10  ICF 1:20 Above 130    2. Encounter for long-term (current) use of insulin (HCC)  She has never been discussed what a CGM is    3. Essential hypertension  This is stable today and no new changes are needed or required in today's visit      ROS:   Constitutional: No change in weight , No fatigue, No night sweats.  HEENT: No Headache.  Eyes:  No blurred vision, No visual changes.  Cardiac: No chest pain, No palpitations.  Resp: No shortness of breath, No cough,   Gastro: No nausea or vomiting, No diarrhea.  Neuro: Denies numbness or tinging in bilateral feet or hands, and no loss of sensation.  Endo: No heat or cold intolerance.  : No polyuria, No polydipsia, No chronic UTI's.  Lower extremities: No lower leg edema bilateral.  All other systems were reviewed and were negative.    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    Social History:  Social History     Social History   • Marital status:      Spouse name: N/A   • Number of children: N/A   • Years of education: N/A     Occupational History   • Not on file.     Social History Main Topics   • Smoking status: Never Smoker   • Smokeless tobacco: Never Used   • Alcohol use Yes      Comment: wine socially   • Drug use: No   • Sexual activity: Not on file     Other Topics Concern   • Not on file     Social History Narrative   • No narrative on file       Family History:  Family History   Problem Relation Age of Onset   • Cancer Father    • Cancer Sister        Medications:    Current Outpatient Prescriptions:   •  insulin glargine (LANTUS SOLOSTAR) 100 UNIT/ML Solution Pen-injector injection, Inject 30 Units as instructed every evening., Disp: 27 mL, Rfl: 3  •  insulin lispro, Human, (HUMALOG) 100 UNIT/ML Solution Pen-injector injection, Inject 10 Units as instructed 3 times a day before meals., Disp: 27 mL, Rfl: 3  •  Insulin Pen Needle (PEN NEEDLES) 31G X 6 MM Misc, Using 4 pen needles per day with insulin injection, Disp: 300 Each, Rfl: 3  •  Nutritional Supplements (VITAMIN D MAINTENANCE PO), Take  by mouth., Disp: , Rfl:   •  losartan (COZAAR) 50 MG Tab, Take 50 mg by mouth every day., Disp: , Rfl:   •  simvastatin (ZOCOR) 20 MG TABS, Take 20 mg by mouth every evening., Disp: , Rfl:   •  aspirin (ASA) 325 MG TABS, Take 325 mg by mouth every 6 hours as needed., Disp: , Rfl:   •  therapeutic multivitamin-minerals (THERAGRAN-M) TABS, Take 1 Tab by mouth every day., Disp: , Rfl:   •  Misc Natural Products (GLUCOSAMINE CHOND COMPLEX/MSM PO), Take 1 Tab by mouth  "every day at 6 PM., Disp: , Rfl:   •  docosahexanoic acid (OMEGA 3 FA) 1000 MG CAPS, Take 1,000 mg by mouth every day at 6 PM., Disp: , Rfl:       Physical Examination:  Vital signs: /70 (BP Location: Left arm, Patient Position: Sitting)   Pulse 97   Ht 1.715 m (5' 7.5\")   Wt 65.8 kg (145 lb)   SpO2 96%   BMI 22.38 kg/m²   General: No distress, cooperative, well dressed and well nourished.   Eyes: No scleral icterus or discharge, No hyposphagma  ENMT: Normal on external inspection of nose, lips, No nasal drainage   Neck: No abnormal masses on inspection  Resp: Normal effort, Bilateral clear to auscultation, No wheezing, No rales  CVS: Regular rate and rhythm, S1 S2 normal, No murmur. No gallop  Extremities: No edema bilateral extremities  Neuro: Alert and oriented  Skin: No rash, No Ulcers  Psych: Normal mood and affect    Assessment and Plan:    1. Uncontrolled type 1 diabetes mellitus with hypoglycemia without coma (HCC)  They are on:  1.  Lantus  26 units in the AM (STOP)  2.  Humalog   Carb ratio 1:10  ICF 1:20 Above 130  3.  Start Tresiba 24 at night (Saturday) but for tonight just do 10 units (Friday)      2. Encounter for long-term (current) use of insulin (HCC)  Is on a high risk medication Insulin and we will continue to follow     I pent a lot of time today going over a Decxom. CGM.     3. Essential hypertension  This is stable today and no new changes are needed or required in today's visit    Return in about 2 months (around 5/15/2019).       This patient during there office visit was started on new medication Tresiba .  Side effects of new medications were discussed with the patient today in the office. The patient was supplied paperwork on this new medication.    Thank you kindly for allowing me to participate in the diabetes care plan for this patient.    Kasi Wilde PA-C, BC-ADM  Board Certified - [facility] [employer] [facility]  03/15/19    CC:   Tamie Siddiqi M.D.    "

## 2019-03-15 NOTE — PATIENT INSTRUCTIONS
1.  Lantus  26 units in the AM (STOP)  2.  Humalog   Carb ratio 1:10  ICF 1:20 Above 130  3.  Start Tresiba 24 at night (Saturday) but for tonight just do 10 units (Friday)

## 2019-04-30 ENCOUNTER — OFFICE VISIT (OUTPATIENT)
Dept: ENDOCRINOLOGY | Facility: MEDICAL CENTER | Age: 74
End: 2019-04-30
Payer: MEDICARE

## 2019-04-30 VITALS
BODY MASS INDEX: 22.73 KG/M2 | WEIGHT: 150 LBS | HEIGHT: 68 IN | OXYGEN SATURATION: 98 % | DIASTOLIC BLOOD PRESSURE: 80 MMHG | SYSTOLIC BLOOD PRESSURE: 130 MMHG | HEART RATE: 97 BPM

## 2019-04-30 DIAGNOSIS — I10 ESSENTIAL HYPERTENSION: ICD-10-CM

## 2019-04-30 DIAGNOSIS — E10.65 UNCONTROLLED TYPE 1 DIABETES MELLITUS WITH HYPERGLYCEMIA (HCC): ICD-10-CM

## 2019-04-30 DIAGNOSIS — Z79.4 ENCOUNTER FOR LONG-TERM (CURRENT) USE OF INSULIN (HCC): ICD-10-CM

## 2019-04-30 PROCEDURE — 99214 OFFICE O/P EST MOD 30 MIN: CPT | Performed by: PHYSICIAN ASSISTANT

## 2019-04-30 NOTE — PROGRESS NOTES
Return to office Patient Consult Note  Referred by: Tamie Siddiqi M.D.    Reason for consult: Diabetes Management Type 1    HPI:  Kymberly Vuong is a 73 y.o. old patient who is seeing us today for diabetes care.  This is a pleasant patient with diabetes and I appreciate the opportunity to participate in the care of this patient.    Labs of 3/15/19 HbA1c is 8.4  Labs of 10/19/18 HbA1c is 8.2  Labs of 4/13/18 HbA1c is 7.4    BG Diary:4/30/2019  In the AM:  No log    1. Uncontrolled type 1 diabetes mellitus with hyperglycemia (HCC)  This is a new patient with me on 3/15/2019  They are on:  1.  Tresiba  24 units in the AM  2.  Humalog   Carb ratio 1:10  ICF 1:20 Above 130    2. Encounter for long-term (current) use of insulin (HCC)  Is on a high risk medication Insulin and we will continue to follow       3. Essential hypertension  This is stable today and no new changes are needed or required in today's visit      ROS:   Constitutional: No night sweats.  Eyes:  No visual changes.  Cardiac: No chest pain, No palpitations or racing heart rate.  Resp: No shortness of breath, No cough,   Gi: No Diarrhea    All other systems were reviewed and were/are negative.  The ROS was revised/revisited during this office visit from the patients first office visit with me on 3/15/19 Please review the full ROS during the first office visit.    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    Social History:  Social History     Social History   • Marital status:      Spouse name: N/A   • Number of children: N/A   • Years of education: N/A     Occupational History   • Not on file.     Social History Main Topics   • Smoking status: Never Smoker   • Smokeless tobacco: Never Used   • Alcohol use Yes      Comment: wine socially   • Drug use: No   • Sexual activity: Not on file     Other Topics Concern   • Not on file     Social History Narrative   • No narrative on file       Family History:  Family History   Problem Relation Age of Onset   • Cancer Father    • Cancer Sister        Medications:    Current Outpatient Prescriptions:   •  insulin glargine (LANTUS SOLOSTAR) 100 UNIT/ML Solution Pen-injector injection, Inject 30 Units as instructed every evening., Disp: 27 mL, Rfl: 3  •  insulin lispro, Human, (HUMALOG) 100 UNIT/ML Solution Pen-injector injection, Inject 10 Units as instructed 3 times a day before meals., Disp: 27 mL, Rfl: 3  •  Insulin Pen Needle (PEN NEEDLES) 31G X 6 MM Misc, Using 4 pen needles per day with insulin injection, Disp: 300 Each, Rfl: 3  •  Nutritional Supplements (VITAMIN D MAINTENANCE PO), Take  by mouth., Disp: , Rfl:   •  losartan (COZAAR) 50 MG Tab, Take 50 mg by mouth every day., Disp: , Rfl:   •  simvastatin (ZOCOR) 20 MG TABS, Take 20 mg by mouth every evening., Disp: , Rfl:   •  aspirin (ASA) 325 MG TABS, Take 325 mg by mouth every 6 hours as needed., Disp: , Rfl:   •  therapeutic multivitamin-minerals (THERAGRAN-M) TABS, Take 1 Tab by mouth every day., Disp: , Rfl:   •  Misc Natural Products (GLUCOSAMINE CHOND COMPLEX/MSM PO), Take 1 Tab by mouth every day at 6 PM., Disp: , Rfl:   •  docosahexanoic acid (OMEGA 3 FA) 1000 MG CAPS, Take 1,000 mg by mouth every day at 6 PM., Disp: , Rfl:         Physical Examination:   Vital signs: /80 (BP  "Location: Left arm, Patient Position: Sitting)   Pulse 97   Ht 1.715 m (5' 7.5\")   Wt 68 kg (150 lb)   SpO2 98%   BMI 23.15 kg/m²   General: No distress, cooperative, well dressed and well nourished.   Eyes: No scleral icterus or discharge, No hyposphagma  ENMT: Normal on external inspection of nose, lips, No nasal drainage   Neck: No abnormal masses on inspection  Resp: Normal effort, Bilateral clear to auscultation, No wheezing, No rales  CVS: Regular rate and rhythm, S1 S2 normal, No murmur. No gallop  Extremities: No edema bilateral extremities  Neuro: Alert and oriented  Skin: No rash, No Ulcers  Psych: Normal mood and affect      Assessment and Plan:    1. Uncontrolled type 1 diabetes mellitus with hyperglycemia (HCC)  They are on:  1.  Tresiba  24 units in the AM (Decrease 22)  2.  Humalog   Carb ratio 1:10  ICF 1:20 Above 130    2. Encounter for long-term (current) use of insulin (HCC)  Is on a high risk medication Insulin and we will continue to follow     We discussed Dexcom again    3. Essential hypertension  This is stable today and no new changes are needed or required in today's visit      Return in about 3 months (around 7/30/2019).      Thank you kindly for allowing me to participate in the diabetes care plan for this patient.    Kasi Wilde PA-C, BC-ADM  Board Certified - [employer] [facility]  04/30/19    CC:   Tamie Siddiqi M.D.    "

## 2019-04-30 NOTE — PATIENT INSTRUCTIONS
They are on:  1.  Tresiba  24 units in the AM (Decrease 22)  2.  Humalog   Carb ratio 1:10  ICF 1:20 Above 130

## 2019-08-06 ENCOUNTER — APPOINTMENT (OUTPATIENT)
Dept: ENDOCRINOLOGY | Facility: MEDICAL CENTER | Age: 74
End: 2019-08-06
Payer: MEDICARE

## 2020-02-10 DIAGNOSIS — E10.65 UNCONTROLLED TYPE 1 DIABETES MELLITUS WITH HYPERGLYCEMIA (HCC): ICD-10-CM

## 2020-02-10 RX ORDER — INSULIN LISPRO 100 [IU]/ML
10 INJECTION, SOLUTION INTRAVENOUS; SUBCUTANEOUS
Qty: 9 PEN | Refills: 2 | Status: SHIPPED | OUTPATIENT
Start: 2020-02-10 | End: 2020-06-22 | Stop reason: SDUPTHER

## 2020-06-18 DIAGNOSIS — Z79.4 ENCOUNTER FOR LONG-TERM (CURRENT) USE OF INSULIN (HCC): ICD-10-CM

## 2020-06-18 DIAGNOSIS — E10.65 UNCONTROLLED TYPE 1 DIABETES MELLITUS WITH HYPERGLYCEMIA (HCC): ICD-10-CM

## 2020-06-18 DIAGNOSIS — I10 ESSENTIAL HYPERTENSION: ICD-10-CM

## 2020-06-19 RX ORDER — INSULIN DEGLUDEC 200 U/ML
INJECTION, SOLUTION SUBCUTANEOUS
Qty: 9 ML | Refills: 5 | Status: SHIPPED | OUTPATIENT
Start: 2020-06-19 | End: 2020-07-10 | Stop reason: SDUPTHER

## 2020-06-19 NOTE — TELEPHONE ENCOUNTER
Received request via: Pharmacy    Was the patient seen in the last year in this department? Yes    Does the patient have an active prescription (recently filled or refills available) for medication(s) requested? No       TRESIBA FLEXTOUCH 200 UNIT/ML Solution Pen-injector         Sig: INJECT 22 UNITS AS INSTRUCTED EVERY BEDTIME.

## 2020-06-22 DIAGNOSIS — E10.65 UNCONTROLLED TYPE 1 DIABETES MELLITUS WITH HYPERGLYCEMIA (HCC): ICD-10-CM

## 2020-06-22 RX ORDER — INSULIN LISPRO 100 [IU]/ML
10 INJECTION, SOLUTION INTRAVENOUS; SUBCUTANEOUS
Qty: 30 PEN | Refills: 0 | Status: SHIPPED | OUTPATIENT
Start: 2020-06-22 | End: 2020-07-10 | Stop reason: SDUPTHER

## 2020-07-10 ENCOUNTER — OFFICE VISIT (OUTPATIENT)
Dept: ENDOCRINOLOGY | Facility: MEDICAL CENTER | Age: 75
End: 2020-07-10
Attending: INTERNAL MEDICINE
Payer: MEDICARE

## 2020-07-10 VITALS
WEIGHT: 147 LBS | HEIGHT: 68 IN | SYSTOLIC BLOOD PRESSURE: 122 MMHG | BODY MASS INDEX: 22.28 KG/M2 | DIASTOLIC BLOOD PRESSURE: 70 MMHG | OXYGEN SATURATION: 94 % | HEART RATE: 62 BPM

## 2020-07-10 DIAGNOSIS — Z79.4 ENCOUNTER FOR LONG-TERM (CURRENT) USE OF INSULIN (HCC): ICD-10-CM

## 2020-07-10 DIAGNOSIS — E78.5 DYSLIPIDEMIA: ICD-10-CM

## 2020-07-10 DIAGNOSIS — E10.65 UNCONTROLLED TYPE 1 DIABETES MELLITUS WITH HYPERGLYCEMIA (HCC): ICD-10-CM

## 2020-07-10 DIAGNOSIS — I10 ESSENTIAL HYPERTENSION: ICD-10-CM

## 2020-07-10 DIAGNOSIS — Z79.4 LONG-TERM INSULIN USE (HCC): ICD-10-CM

## 2020-07-10 LAB
HBA1C MFR BLD: 7.7 % (ref 0–5.6)
INT CON NEG: ABNORMAL
INT CON POS: ABNORMAL

## 2020-07-10 PROCEDURE — 99214 OFFICE O/P EST MOD 30 MIN: CPT | Performed by: INTERNAL MEDICINE

## 2020-07-10 PROCEDURE — 83036 HEMOGLOBIN GLYCOSYLATED A1C: CPT | Performed by: INTERNAL MEDICINE

## 2020-07-10 PROCEDURE — 99213 OFFICE O/P EST LOW 20 MIN: CPT | Performed by: INTERNAL MEDICINE

## 2020-07-10 RX ORDER — INSULIN LISPRO 100 [IU]/ML
10 INJECTION, SOLUTION INTRAVENOUS; SUBCUTANEOUS
Qty: 15 PEN | Refills: 3 | Status: SHIPPED | OUTPATIENT
Start: 2020-07-10 | End: 2020-10-08

## 2020-07-10 RX ORDER — INSULIN DEGLUDEC 200 U/ML
22 INJECTION, SOLUTION SUBCUTANEOUS DAILY
Qty: 9 PEN | Refills: 2 | Status: SHIPPED | OUTPATIENT
Start: 2020-07-10 | End: 2020-10-08

## 2020-07-11 NOTE — PROGRESS NOTES
CHIEF COMPLAINT: Patient is here for follow up of Type 1 Diabetes Mellitus    HPI:     Kymberly Vuong is a 75 y.o. female with Type 1 Diabetes Mellitus here for follow up.    Labs from 7/10/2020 HbA1c show A1c is slightly better at 7.7%  Previously her A1c was elevated at 8.5%    She was previously seen by the PA.    She has had type 1 diabetes for over 50 years.  She does not like wearing a glucose sensor or pump.   She prefers multiple daily insulin injections        She is taking Tresiba 22 units daily, Humalog with a carb ratio of 1 unit for every 10 g averaging 5 units per meal, her correction scale is 1 unit for every 20 above a target sugar of 130    She forgot to bring her meter but she reports less hyperglycemia and she denies hypoglycemia  She has been testing her sugars over 4 times a day for the past 6 months  She has been injecting insulin over 4 times a day for the past 6 months      She has hyperlipidemia and is taking simvastatin 20 mg daily  She is tolerating this well  We do not have a recent lipid panel on hand  Last LDL cholesterol was 104 back in April 2018      She has essential hypertension and is taking losartan 50 mg daily  Her blood pressure today is well controlled  She is tolerating her medications fairly well  We do not have a recent urine microalbumin on hand      BG Diary:07/10/20  Patient f forgot to bring glucose meter despite repeated request to do so    Weight has been stable    Diabetes Complications   Retinopathy: No known retinopathy.  Last eye exam: June 2020 at [facility]  Neuropathy: Denies paresthesias or numbness in hands or feet. Denies any foot wounds.  Exercise: Minimal.  Diet: Fair.  Patient's medications, allergies, and social histories were reviewed and updated as appropriate.    ROS:     CONS:     No fever, no chills   EYES:     No diplopia, no blurry vision   CV:           No chest pain, no palpitations   PULM:     No SOB, no cough, no hemoptysis.  "  GI:            No nausea, no vomiting, no diarrhea, no constipation   ENDO:     No polyuria, no polydipsia, no heat intolerance, no cold intolerance       Past Medical History:  Problem List:  2020-07: Long-term insulin use ([facility])  2016-07: Vitamin D insufficiency  2015-02: Encounter for long-term (current) use of insulin ([facility])  2014-02: Uncontrolled type 1 diabetes mellitus ([facility])  2014-02: Essential hypertension  2014-02: Dyslipidemia  2014-02: Albuminuria manifested in a patient with type 1 DM      Past Surgical History:  Past Surgical History:   Procedure Laterality Date   • CATARACT PHACO WITH IOL  3/10/2009    Performed by VALE HAND at [facility]   • CATARACT PHACO WITH IOL  2/24/2009    Performed by VALE HAND at [facility]        Allergies:  Patient has no known allergies.     Social History:  Social History     Tobacco Use   • Smoking status: Never Smoker   • Smokeless tobacco: Never Used   Substance Use Topics   • Alcohol use: Yes     Comment: wine socially   • Drug use: No        Family History:   family history includes Cancer in her father and sister.      PHYSICAL EXAM:   OBJECTIVE:  Vital signs: /70   Pulse 62   Ht 1.715 m (5' 7.5\")   Wt 66.7 kg (147 lb)   SpO2 94%   BMI 22.68 kg/m²   GENERAL: Well-developed, well-nourished in no apparent distress.   EYE:  No ocular asymmetry, PERRLA  HENT: Pink, moist mucous membranes.    NECK: No thyromegaly.   CARDIOVASCULAR:  No murmurs  LUNGS: Clear breath sounds  ABDOMEN: Soft, nontender   EXTREMITIES: No clubbing, cyanosis, or edema.   NEUROLOGICAL: No gross focal motor abnormalities   LYMPH: No cervical adenopathy palpated.   SKIN: No rashes, lesions.     Labs:  Lab Results   Component Value Date/Time    HBA1C 7.7 (A) 07/10/2020 02:23 PM        Lab Results   Component Value Date/Time    WBC 3.8 (L) 04/25/2007 10:56 AM    RBC 4.71 04/25/2007 10:56 AM    HEMOGLOBIN 15.2 04/25/2007 10:56 AM    MCV " 98.2 (H) 04/25/2007 10:56 AM    MCH 32.3 04/25/2007 10:56 AM    MCHC 32.9 04/25/2007 10:56 AM    RDW 13.5 04/25/2007 10:56 AM    MPV 7.0 04/25/2007 10:56 AM       Lab Results   Component Value Date/Time    SODIUM 138 04/04/2018 08:53 AM    SODIUM 132 (L) 04/25/2007 10:56 AM    POTASSIUM 4.6 04/04/2018 08:53 AM    POTASSIUM 3.3 (L) 04/25/2007 10:56 AM    CHLORIDE 97 04/04/2018 08:53 AM    CHLORIDE 98 04/25/2007 10:56 AM    CO2 24 04/04/2018 08:53 AM    CO2 26 04/25/2007 10:56 AM    GLUCOSE 140 (H) 04/04/2018 08:53 AM    GLUCOSE 211 (H) 04/25/2007 10:56 AM    BUN 18 04/04/2018 08:53 AM    BUN 13 04/25/2007 10:56 AM    CREATININE 0.63 04/04/2018 08:53 AM    CREATININE 0.62 06/07/2013    CALCIUM 9.0 04/04/2018 08:53 AM    CALCIUM 8.7 04/25/2007 10:56 AM    ASTSGOT 23 06/12/2017 08:41 AM    ALTSGPT 19 06/12/2017 08:41 AM    TBILIRUBIN 0.5 06/12/2017 08:41 AM    ALBUMIN 3.9 06/12/2017 08:41 AM    TOTPROTEIN 6.6 06/12/2017 08:41 AM    GLOBULIN 2.7 06/12/2017 08:41 AM    AGRATIO 1.4 06/12/2017 08:41 AM       Lab Results   Component Value Date/Time    CHOLSTRLTOT 186 04/04/2018 0853    TRIGLYCERIDE 102 04/04/2018 0853    HDL 62 04/04/2018 0853     (H) 04/04/2018 0853       Lab Results   Component Value Date/Time    MICROALBCALC 8.5 06/12/2017 08:41 AM    MICROALBCALC 89.8 06/07/2013    MICRALB 10.8 04/04/2018 08:53 AM        No results found for: TSHULTRASEN  No results found for: FREEDIR  No results found for: FREET3  No results found for: THYSTIMIG        ASSESSMENT/PLAN:     1. Uncontrolled type 1 diabetes mellitus with hyperglycemia (HCC)  Uncontrolled secondary to hyperglycemia  Recommend better compliance with bolusing and improve carb counting  Continue Tresiba 22 units daily  I recommend continue a carb ratio of 1-10  Continue correction scale of 1 unit for every 20 above a target sugar 130  Recommend that she bring her meter next time  We discussed getting a glucose sensor but patient declined due to personal  reasons  We will plan for follow-up in 3 months with labs    2. Dyslipidemia  Unstable  Continue simvastatin  I am checking a fasting lipid profile with her next labs in 3 months    3. Essential hypertension  Well-controlled  Continue losartan    4. Long-term insulin use (HCC)  Patient is on long-term insulin therapy due to type 1 diabetes      Return in about 3 months (around 10/10/2020).      Thank you kindly for allowing me to participate in the diabetes care plan for this patient.    Constantino Vazquez MD, [facility], [facility]  07/10/20    CC:   Tamie Siddiqi M.D.

## 2020-11-30 ENCOUNTER — OFFICE VISIT (OUTPATIENT)
Dept: ENDOCRINOLOGY | Facility: MEDICAL CENTER | Age: 75
End: 2020-11-30
Attending: INTERNAL MEDICINE
Payer: MEDICARE

## 2020-11-30 VITALS
BODY MASS INDEX: 22.02 KG/M2 | HEIGHT: 68 IN | HEART RATE: 107 BPM | SYSTOLIC BLOOD PRESSURE: 124 MMHG | DIASTOLIC BLOOD PRESSURE: 66 MMHG | OXYGEN SATURATION: 97 % | WEIGHT: 145.3 LBS

## 2020-11-30 DIAGNOSIS — Z79.4 LONG-TERM INSULIN USE (HCC): ICD-10-CM

## 2020-11-30 DIAGNOSIS — E78.5 DYSLIPIDEMIA: ICD-10-CM

## 2020-11-30 DIAGNOSIS — I10 ESSENTIAL HYPERTENSION: ICD-10-CM

## 2020-11-30 DIAGNOSIS — E10.65 UNCONTROLLED TYPE 1 DIABETES MELLITUS WITH HYPERGLYCEMIA (HCC): ICD-10-CM

## 2020-11-30 LAB
HBA1C MFR BLD: 8.2 % (ref 0–5.6)
INT CON NEG: ABNORMAL
INT CON POS: ABNORMAL

## 2020-11-30 PROCEDURE — 99211 OFF/OP EST MAY X REQ PHY/QHP: CPT | Performed by: INTERNAL MEDICINE

## 2020-11-30 PROCEDURE — 83036 HEMOGLOBIN GLYCOSYLATED A1C: CPT | Performed by: INTERNAL MEDICINE

## 2020-11-30 PROCEDURE — 99214 OFFICE O/P EST MOD 30 MIN: CPT | Performed by: INTERNAL MEDICINE

## 2020-11-30 RX ORDER — INSULIN LISPRO 100 [IU]/ML
10 INJECTION, SOLUTION INTRAVENOUS; SUBCUTANEOUS
Qty: 5 EACH | Refills: 11 | Status: SHIPPED | OUTPATIENT
Start: 2020-11-30 | End: 2022-03-09

## 2020-11-30 RX ORDER — INSULIN DEGLUDEC 100 U/ML
22 INJECTION, SOLUTION SUBCUTANEOUS
Qty: 5 EACH | Refills: 11 | Status: SHIPPED | OUTPATIENT
Start: 2020-11-30 | End: 2022-03-09 | Stop reason: SDUPTHER

## 2020-11-30 NOTE — LETTER
[employer] [facility]  Tamie Siddiqi M.D.  [address]  [place] [address]  Fax: [phone]   Authorization for Release/Disclosure of   Protected Health Information   Name: KYMBERLY HAMMONDS : 1945 SSN: [id number]   Address: [place] [address] Phone:    [phone] (home)    I authorize the entity listed below to release/disclose the PHI below to:   [facility]/Tamie Siddiqi M.D. and Constantino Vazquez M.D.   Provider or Entity Name:  JERRY ARORA    Address   City, State, Zip   Phone:      Fax:     Reason for request: continuity of care   Information to be released:    [  ] LAST COLONOSCOPY,  including any PATH REPORT and follow-up  [  ] LAST FIT/COLOGUARD RESULT [  ] LAST DEXA  [  ] LAST MAMMOGRAM  [  ] LAST PAP  [  ] LAST LABS [ x ] RETINA EXAM REPORT  [  ] IMMUNIZATION RECORDS  [  ] Release all info      [  ] Check here and initial the line next to each item to release ALL health information INCLUDING  _____ Care and treatment for drug and / or alcohol abuse  _____ HIV testing, infection status, or AIDS  _____ Genetic Testing    DATES OF SERVICE OR TIME PERIOD TO BE DISCLOSED: _____________  I understand and acknowledge that:  * This Authorization may be revoked at any time by you in writing, except if your health information has already been used or disclosed.  * Your health information that will be used or disclosed as a result of you signing this authorization could be re-disclosed by the recipient. If this occurs, your re-disclosed health information may no longer be protected by State or Federal laws.  * You may refuse to sign this Authorization. Your refusal will not affect your ability to obtain treatment.  * This Authorization becomes effective upon signing and will  on (date) __________.      If no date is indicated, this Authorization will  one (1) year from the signature date.    Name: Kymberly Hammonds    Signature:   Date:     2020       PLEASE  FAX REQUESTED RECORDS BACK TO: [phone]

## 2020-11-30 NOTE — PROGRESS NOTES
CHIEF COMPLAINT: Patient is here for follow up of Type 1 Diabetes Mellitus    HPI:     Kymberly Vuong is a 75 y.o. female with Type 1 Diabetes Mellitus here for follow up.    Labs from 11/30/2020 show HbA1c merrick to 8.2%  Labs from 7/10/2020 HbA1c show A1c is slightly better at 7.7%  Previously her A1c was elevated at 8.5%    She was previously seen by the PA.    She has had type 1 diabetes for over 50 years.  She does not like wearing a glucose sensor or pump.   She prefers multiple daily insulin injections    She forgot to do her labs despite requests to do so      She is taking Tresiba 22 units daily, Humalog with a carb ratio of 1 unit for every 10 g averaging 5 units per meal, her correction scale is 1 unit for every 20 above a target sugar of 130    She forgot to bring her meter again  She got Covid but had no complications      She has hyperlipidemia and is taking simvastatin 20 mg daily  She is tolerating this well  We do not have a recent lipid panel on hand  Last LDL cholesterol was 104 back in April 2018      She has essential hypertension and is taking losartan 50 mg daily  Her blood pressure today is well controlled  She is tolerating her medications fairly well  We do not have a recent urine microalbumin on hand      BG Diary:  Patient f forgot to bring glucose meter despite repeated request to do so    Weight has been stable    Diabetes Complications   Retinopathy: No known retinopathy.  Last eye exam: June 2020 at [facility]  Neuropathy: Denies paresthesias or numbness in hands or feet. Denies any foot wounds.  Exercise: Minimal.  Diet: Fair.  Patient's medications, allergies, and social histories were reviewed and updated as appropriate.    ROS:     CONS:     No fever, no chills   EYES:     No diplopia, no blurry vision   CV:           No chest pain, no palpitations   PULM:     No SOB, no cough, no hemoptysis.   GI:            No nausea, no vomiting, no diarrhea, no constipation  "  ENDO:     No polyuria, no polydipsia, no heat intolerance, no cold intolerance       Past Medical History:  Problem List:  2020-07: Long-term insulin use ([facility])  2016-07: Vitamin D insufficiency  2015-02: Encounter for long-term (current) use of insulin ([facility])  2014-02: Uncontrolled type 1 diabetes mellitus (HCC)  2014-02: Essential hypertension  2014-02: Dyslipidemia  2014-02: Albuminuria manifested in a patient with type 1 DM      Past Surgical History:  Past Surgical History:   Procedure Laterality Date   • CATARACT PHACO WITH IOL  3/10/2009    Performed by VALE HAND [facility]   • CATARACT PHACO WITH IOL  2/24/2009    Performed by VALE HAND at [facility]        Allergies:  Patient has no known allergies.     Social History:  Social History     Tobacco Use   • Smoking status: Never Smoker   • Smokeless tobacco: Never Used   Substance Use Topics   • Alcohol use: Yes     Comment: wine socially   • Drug use: No        Family History:   family history includes Cancer in her father and sister.      PHYSICAL EXAM:   OBJECTIVE:  Vital signs: /66   Pulse (!) 107   Ht 1.715 m (5' 7.5\")   Wt 65.9 kg (145 lb 4.8 oz)   SpO2 97%   BMI 22.42 kg/m²   GENERAL: Well-developed, well-nourished in no apparent distress.   EYE:  No ocular asymmetry, PERRLA  HENT: Pink, moist mucous membranes.    NECK: No thyromegaly.   CARDIOVASCULAR:  No murmurs  LUNGS: Clear breath sounds  ABDOMEN: Soft, nontender   EXTREMITIES: No clubbing, cyanosis, or edema.   NEUROLOGICAL: No gross focal motor abnormalities   LYMPH: No cervical adenopathy palpated.   SKIN: No rashes, lesions.     Labs:  Lab Results   Component Value Date/Time    HBA1C 8.2 (A) 11/30/2020 03:10 PM        Lab Results   Component Value Date/Time    WBC 3.8 (L) 04/25/2007 10:56 AM    RBC 4.71 04/25/2007 10:56 AM    HEMOGLOBIN 15.2 04/25/2007 10:56 AM    MCV 98.2 (H) 04/25/2007 10:56 AM    MCH 32.3 04/25/2007 10:56 AM "    MCHC 32.9 04/25/2007 10:56 AM    RDW 13.5 04/25/2007 10:56 AM    MPV 7.0 04/25/2007 10:56 AM       Lab Results   Component Value Date/Time    SODIUM 138 04/04/2018 08:53 AM    SODIUM 132 (L) 04/25/2007 10:56 AM    POTASSIUM 4.6 04/04/2018 08:53 AM    POTASSIUM 3.3 (L) 04/25/2007 10:56 AM    CHLORIDE 97 04/04/2018 08:53 AM    CHLORIDE 98 04/25/2007 10:56 AM    CO2 24 04/04/2018 08:53 AM    CO2 26 04/25/2007 10:56 AM    GLUCOSE 140 (H) 04/04/2018 08:53 AM    GLUCOSE 211 (H) 04/25/2007 10:56 AM    BUN 18 04/04/2018 08:53 AM    BUN 13 04/25/2007 10:56 AM    CREATININE 0.63 04/04/2018 08:53 AM    CREATININE 0.62 06/07/2013    CALCIUM 9.0 04/04/2018 08:53 AM    CALCIUM 8.7 04/25/2007 10:56 AM    ASTSGOT 23 06/12/2017 08:41 AM    ALTSGPT 19 06/12/2017 08:41 AM    TBILIRUBIN 0.5 06/12/2017 08:41 AM    ALBUMIN 3.9 06/12/2017 08:41 AM    TOTPROTEIN 6.6 06/12/2017 08:41 AM    GLOBULIN 2.7 06/12/2017 08:41 AM    AGRATIO 1.4 06/12/2017 08:41 AM       Lab Results   Component Value Date/Time    CHOLSTRLTOT 186 04/04/2018 0853    TRIGLYCERIDE 102 04/04/2018 0853    HDL 62 04/04/2018 0853     (H) 04/04/2018 0853       Lab Results   Component Value Date/Time    MICROALBCALC 8.5 06/12/2017 08:41 AM    MICROALBCALC 89.8 06/07/2013    MICRALB 10.8 04/04/2018 08:53 AM        No results found for: TSHULTRASEN  No results found for: FREEDIR  No results found for: FREET3  No results found for: THYSTIMIG        ASSESSMENT/PLAN:     1. Uncontrolled type 1 diabetes mellitus with hyperglycemia (HCC)  Uncontrolled secondary to hyperglycemia  She is noncompliant  Recommend that she bring her meter next time  Continue Tresiba 22 units daily  I recommend continue a carb ratio of 1:10  Continue correction scale of 1 unit for every 20 above a target sugar 130    She is getting labs done this week  We will plan for follow-up in 3 months with labs    2. Dyslipidemia  Unstable  Continue simvastatin  She is getting labs this week    3.  Essential hypertension  Well-controlled  Continue losartan    4. Long-term insulin use (HCC)  Patient is on long-term insulin therapy due to type 1 diabetes      Return in about 3 months (around 2/28/2021).      Thank you kindly for allowing me to participate in the diabetes care plan for this patient.    Constantino Vazquez MD, [facility], [facility]  07/10/20    CC:   Tamie Siddiqi M.D.

## 2020-11-30 NOTE — LETTER
[employer] [facility]  Tamie Siddiqi M.D.  [address]  [place] [address]  Fax: [phone]   Authorization for Release/Disclosure of   Protected Health Information   Name: KYMBERLY HAMMONDS : 1945 SSN: [id number]   Address: [place] [address] Phone:    [phone] (home)    I authorize the entity listed below to release/disclose the PHI below to:   [facility]/Tamie Siddiqi M.D. and Constantino Vazquez M.D.   Provider or Entity Name:     Address   City, State, Zip   Phone:      Fax:     Reason for request: continuity of care   Information to be released:    [  ] LAST COLONOSCOPY,  including any PATH REPORT and follow-up  [  ] LAST FIT/COLOGUARD RESULT [  ] LAST DEXA  [  ] LAST MAMMOGRAM  [  ] LAST PAP  [  ] LAST LABS [  ] RETINA EXAM REPORT  [  ] IMMUNIZATION RECORDS  [  ] Release all info      [  ] Check here and initial the line next to each item to release ALL health information INCLUDING  _____ Care and treatment for drug and / or alcohol abuse  _____ HIV testing, infection status, or AIDS  _____ Genetic Testing    DATES OF SERVICE OR TIME PERIOD TO BE DISCLOSED: _____________  I understand and acknowledge that:  * This Authorization may be revoked at any time by you in writing, except if your health information has already been used or disclosed.  * Your health information that will be used or disclosed as a result of you signing this authorization could be re-disclosed by the recipient. If this occurs, your re-disclosed health information may no longer be protected by State or Federal laws.  * You may refuse to sign this Authorization. Your refusal will not affect your ability to obtain treatment.  * This Authorization becomes effective upon signing and will  on (date) __________.      If no date is indicated, this Authorization will  one (1) year from the signature date.    Name: Kymberly Hammonds    Signature:   Date:     2020       PLEASE FAX REQUESTED  RECORDS BACK TO: [phone]

## 2020-11-30 NOTE — PROGRESS NOTES
"RN Visit    Subjective:     Reason for visit: Kymberly had covid in October.  FSBG were high, now resolved.  She takes corrections at bedtime, then wakes with hypoglycemia    Exercise: no regular exercise, sedentary, walks dogs  Diet: meals per day on average: 3, protein snack at bedtime  Patient's body mass index is 22.42 kg/m². Exercise and nutrition counseling were performed at this visit.      Glucose monitoring frequency: 3x/day  no logs  Hypoglycemic episodes: yes - reports fasting hypoglycemia after taking Humalog at bedtime    Objective:     /66   Pulse (!) 107   Ht 1.715 m (5' 7.5\")   Wt 65.9 kg (145 lb 4.8 oz)   SpO2 97%   BMI 22.42 kg/m²   Monofilament: not done    Plan:     Discussed and educated on:   - All medications, side effects and compliance (discussed carefully)  - Annual eye examinations at Ophthalmology  - Diabetic Meal Plan: foods that contain carbs and plate method  - Home glucose monitoring emphasized  - Weight control and daily exercise    "

## 2020-12-03 LAB
ALBUMIN SERPL-MCNC: 3.8 G/DL (ref 3.7–4.7)
ALBUMIN/CREAT UR: 16 MG/G CREAT (ref 0–29)
ALBUMIN/GLOB SERPL: 1.5 {RATIO} (ref 1.2–2.2)
ALP SERPL-CCNC: 77 IU/L (ref 39–117)
ALT SERPL-CCNC: 19 IU/L (ref 0–32)
AST SERPL-CCNC: 21 IU/L (ref 0–40)
BILIRUB SERPL-MCNC: 0.5 MG/DL (ref 0–1.2)
BUN SERPL-MCNC: 17 MG/DL (ref 8–27)
BUN/CREAT SERPL: 27 (ref 12–28)
CALCIUM SERPL-MCNC: 9.4 MG/DL (ref 8.7–10.3)
CHLORIDE SERPL-SCNC: 100 MMOL/L (ref 96–106)
CHOLEST SERPL-MCNC: 179 MG/DL (ref 100–199)
CO2 SERPL-SCNC: 23 MMOL/L (ref 20–29)
CREAT SERPL-MCNC: 0.62 MG/DL (ref 0.57–1)
CREAT UR-MCNC: 142.8 MG/DL
GLOBULIN SER CALC-MCNC: 2.5 G/DL (ref 1.5–4.5)
GLUCOSE SERPL-MCNC: 188 MG/DL (ref 65–99)
HBA1C MFR BLD: 8.7 % (ref 4.8–5.6)
HDLC SERPL-MCNC: 51 MG/DL
LABORATORY COMMENT REPORT: ABNORMAL
LDLC SERPL CALC-MCNC: 109 MG/DL (ref 0–99)
MICROALBUMIN UR-MCNC: 22.9 UG/ML
POTASSIUM SERPL-SCNC: 4.6 MMOL/L (ref 3.5–5.2)
PROT SERPL-MCNC: 6.3 G/DL (ref 6–8.5)
SODIUM SERPL-SCNC: 137 MMOL/L (ref 134–144)
T4 FREE SERPL-MCNC: 1.39 NG/DL (ref 0.82–1.77)
TRIGL SERPL-MCNC: 103 MG/DL (ref 0–149)
TSH SERPL DL<=0.005 MIU/L-ACNC: 1.07 UIU/ML (ref 0.45–4.5)
VLDLC SERPL CALC-MCNC: 19 MG/DL (ref 5–40)

## 2021-01-14 DIAGNOSIS — Z23 NEED FOR VACCINATION: ICD-10-CM

## 2021-01-28 ENCOUNTER — IMMUNIZATION (OUTPATIENT)
Dept: FAMILY PLANNING/WOMEN'S HEALTH CLINIC | Facility: IMMUNIZATION CENTER | Age: 76
End: 2021-01-28
Attending: INTERNAL MEDICINE
Payer: MEDICARE

## 2021-01-28 DIAGNOSIS — Z23 ENCOUNTER FOR VACCINATION: Primary | ICD-10-CM

## 2021-01-28 DIAGNOSIS — Z23 NEED FOR VACCINATION: ICD-10-CM

## 2021-01-28 PROCEDURE — 91301 MODERNA SARS-COV-2 VACCINE: CPT

## 2021-01-28 PROCEDURE — 0011A MODERNA SARS-COV-2 VACCINE: CPT

## 2021-02-17 LAB
ALBUMIN SERPL-MCNC: 4 G/DL (ref 3.7–4.7)
ALBUMIN/CREAT UR: 21 MG/G CREAT (ref 0–29)
ALBUMIN/GLOB SERPL: 1.5 {RATIO} (ref 1.2–2.2)
ALP SERPL-CCNC: 82 IU/L (ref 39–117)
ALT SERPL-CCNC: 13 IU/L (ref 0–32)
AST SERPL-CCNC: 18 IU/L (ref 0–40)
BILIRUB SERPL-MCNC: 0.4 MG/DL (ref 0–1.2)
BUN SERPL-MCNC: 16 MG/DL (ref 8–27)
BUN/CREAT SERPL: 24 (ref 12–28)
CALCIUM SERPL-MCNC: 9 MG/DL (ref 8.7–10.3)
CHLORIDE SERPL-SCNC: 102 MMOL/L (ref 96–106)
CHOLEST SERPL-MCNC: 170 MG/DL (ref 100–199)
CO2 SERPL-SCNC: 24 MMOL/L (ref 20–29)
CREAT SERPL-MCNC: 0.66 MG/DL (ref 0.57–1)
CREAT UR-MCNC: 121.6 MG/DL
GLOBULIN SER CALC-MCNC: 2.7 G/DL (ref 1.5–4.5)
GLUCOSE SERPL-MCNC: 158 MG/DL (ref 65–99)
HBA1C MFR BLD: 7.6 % (ref 4.8–5.6)
HDLC SERPL-MCNC: 53 MG/DL
LABORATORY COMMENT REPORT: NORMAL
LDLC SERPL CALC-MCNC: 98 MG/DL (ref 0–99)
MICROALBUMIN UR-MCNC: 25.5 UG/ML
POTASSIUM SERPL-SCNC: 4.5 MMOL/L (ref 3.5–5.2)
PROT SERPL-MCNC: 6.7 G/DL (ref 6–8.5)
SODIUM SERPL-SCNC: 141 MMOL/L (ref 134–144)
TRIGL SERPL-MCNC: 105 MG/DL (ref 0–149)
VLDLC SERPL CALC-MCNC: 19 MG/DL (ref 5–40)

## 2021-03-03 ENCOUNTER — OFFICE VISIT (OUTPATIENT)
Dept: ENDOCRINOLOGY | Facility: MEDICAL CENTER | Age: 76
End: 2021-03-03
Attending: INTERNAL MEDICINE
Payer: MEDICARE

## 2021-03-03 VITALS
OXYGEN SATURATION: 99 % | BODY MASS INDEX: 22.23 KG/M2 | HEART RATE: 62 BPM | HEIGHT: 68 IN | WEIGHT: 146.7 LBS | SYSTOLIC BLOOD PRESSURE: 124 MMHG | DIASTOLIC BLOOD PRESSURE: 68 MMHG

## 2021-03-03 DIAGNOSIS — Z79.4 LONG-TERM INSULIN USE (HCC): ICD-10-CM

## 2021-03-03 DIAGNOSIS — E10.65 UNCONTROLLED TYPE 1 DIABETES MELLITUS WITH HYPERGLYCEMIA (HCC): ICD-10-CM

## 2021-03-03 DIAGNOSIS — E78.5 DYSLIPIDEMIA: ICD-10-CM

## 2021-03-03 DIAGNOSIS — I10 ESSENTIAL HYPERTENSION: ICD-10-CM

## 2021-03-03 PROCEDURE — 99214 OFFICE O/P EST MOD 30 MIN: CPT | Performed by: INTERNAL MEDICINE

## 2021-03-03 PROCEDURE — 99211 OFF/OP EST MAY X REQ PHY/QHP: CPT | Performed by: INTERNAL MEDICINE

## 2021-03-03 NOTE — PROGRESS NOTES
CHIEF COMPLAINT: Patient is here for follow up of Type 1 Diabetes Mellitus    HPI:     Kymberly Vuong is a 75 y.o. female with Type 1 Diabetes Mellitus here for follow up.    Labs from  2/16/2021 show HbA1c is 7.6%  Labs from 11/30/2020 show HbA1c was 8.2%  Labs from 7/10/2020  show HbA1c was 7.7%  Previously her A1c was elevated at 8.5%    She was previously seen by the PA.    She has had type 1 diabetes for over 50 years.  She does not like wearing a glucose sensor or pump.   She prefers multiple daily insulin injections          She is taking Tresiba 20 units daily, Humalog with a carb ratio of 1 unit for every 10 g averaging 4 units per meal, her correction scale is 1 unit for every 20 above a target sugar of 130        She has hyperlipidemia and is taking simvastatin 20 mg daily  She is tolerating this well  LDL-C was 98 on Feb 2021        She has essential hypertension and is taking losartan 50 mg daily  Her blood pressure today is well controlled  She is tolerating her medications fairly well  She doesn't have microalbuminuria       BG Diary:  She sues a Relion meter and tests 4 times a day    Weight has been stable    Diabetes Complications   Retinopathy: No known retinopathy.  Last eye exam: June 2020 at [facility]  Neuropathy: Denies paresthesias or numbness in hands or feet. Denies any foot wounds.  Exercise: Minimal.  Diet: Fair.  Patient's medications, allergies, and social histories were reviewed and updated as appropriate.    ROS:     CONS:     No fever, no chills   EYES:     No diplopia, no blurry vision   CV:           No chest pain, no palpitations   PULM:     No SOB, no cough, no hemoptysis.   GI:            No nausea, no vomiting, no diarrhea, no constipation   ENDO:     No polyuria, no polydipsia, no heat intolerance, no cold intolerance       Past Medical History:  Problem List:  2020-07: Long-term insulin use (HCC)  2016-07: Vitamin D insufficiency  2015-02: Encounter for  "long-term (current) use of insulin ([facility])  2014-02: Uncontrolled type 1 diabetes mellitus (HCC)  2014-02: Essential hypertension  2014-02: Dyslipidemia  2014-02: Albuminuria manifested in a patient with type 1 DM      Past Surgical History:  Past Surgical History:   Procedure Laterality Date   • CATARACT PHACO WITH IOL  3/10/2009    Performed by VALE HAND at [facility]   • CATARACT PHACO WITH IOL  2/24/2009    Performed by VALE HAND at [facility]        Allergies:  Patient has no known allergies.     Social History:  Social History     Tobacco Use   • Smoking status: Never Smoker   • Smokeless tobacco: Never Used   Substance Use Topics   • Alcohol use: Yes     Comment: wine socially   • Drug use: No        Family History:   family history includes Cancer in her father and sister.      PHYSICAL EXAM:   OBJECTIVE:  Vital signs: /68 (BP Location: Left arm, Patient Position: Sitting, BP Cuff Size: Adult)   Pulse 62   Ht 1.715 m (5' 7.5\")   Wt 66.5 kg (146 lb 11.2 oz)   SpO2 99%   BMI 22.64 kg/m²   GENERAL: Well-developed, well-nourished in no apparent distress.   EYE:  No ocular asymmetry, PERRLA  HENT: Pink, moist mucous membranes.    NECK: No thyromegaly.   CARDIOVASCULAR:  No murmurs  LUNGS: Clear breath sounds  ABDOMEN: Soft, nontender   EXTREMITIES: No clubbing, cyanosis, or edema.   NEUROLOGICAL: No gross focal motor abnormalities   LYMPH: No cervical adenopathy palpated.   SKIN: No rashes, lesions.   Monofilament testing with a 10 gram force: sensation: intact bilaterally  Visual Inspection: Feet without maceration, ulcers, or fissures.  Pedal pulses: intact bilaterally      Labs:  Lab Results   Component Value Date/Time    HBA1C 7.6 (H) 02/16/2021 05:09 AM    HBA1C 8.2 (A) 11/30/2020 03:10 PM        Lab Results   Component Value Date/Time    WBC 3.8 (L) 04/25/2007 10:56 AM    RBC 4.71 04/25/2007 10:56 AM    HEMOGLOBIN 15.2 04/25/2007 10:56 AM    MCV " 98.2 (H) 04/25/2007 10:56 AM    MCH 32.3 04/25/2007 10:56 AM    MCHC 32.9 04/25/2007 10:56 AM    RDW 13.5 04/25/2007 10:56 AM    MPV 7.0 04/25/2007 10:56 AM       Lab Results   Component Value Date/Time    SODIUM 141 02/16/2021 05:09 AM    SODIUM 132 (L) 04/25/2007 10:56 AM    POTASSIUM 4.5 02/16/2021 05:09 AM    POTASSIUM 3.3 (L) 04/25/2007 10:56 AM    CHLORIDE 102 02/16/2021 05:09 AM    CHLORIDE 98 04/25/2007 10:56 AM    CO2 24 02/16/2021 05:09 AM    CO2 26 04/25/2007 10:56 AM    GLUCOSE 158 (H) 02/16/2021 05:09 AM    GLUCOSE 211 (H) 04/25/2007 10:56 AM    BUN 16 02/16/2021 05:09 AM    BUN 13 04/25/2007 10:56 AM    CREATININE 0.66 02/16/2021 05:09 AM    CREATININE 0.62 06/07/2013 12:00 AM    CALCIUM 9.0 02/16/2021 05:09 AM    CALCIUM 8.7 04/25/2007 10:56 AM    ASTSGOT 18 02/16/2021 05:09 AM    ALTSGPT 13 02/16/2021 05:09 AM    TBILIRUBIN 0.4 02/16/2021 05:09 AM    ALBUMIN 4.0 02/16/2021 05:09 AM    TOTPROTEIN 6.7 02/16/2021 05:09 AM    GLOBULIN 2.7 02/16/2021 05:09 AM    AGRATIO 1.5 02/16/2021 05:09 AM       Lab Results   Component Value Date/Time    CHOLSTRLTOT 186 04/04/2018 0853    TRIGLYCERIDE 102 04/04/2018 0853    HDL 62 04/04/2018 0853     (H) 04/04/2018 0853       Lab Results   Component Value Date/Time    MICROALBCALC 8.5 06/12/2017 08:41 AM    MICROALBCALC 89.8 06/07/2013 12:00 AM    MICRALB 25.5 02/16/2021 05:09 AM        No results found for: TSHULTRASEN  No results found for: FREEDIR  No results found for: FREET3  No results found for: THYSTIMIG        ASSESSMENT/PLAN:     1. Uncontrolled type 1 diabetes mellitus with hyperglycemia (HCC)  Uncontrolled secondary to hyperglycemia  A1c is better  Continue Tresiba 22 units daily  I recommend continue a carb ratio of 1:10  I want her to do 4/5/4 for B/L/D  Continue correction scale of 1 unit for every 20 above a target sugar 130  We will plan for follow-up in 4 months with labs    2. Dyslipidemia  Controlled   Continue simvastatin  Repeat fasting  lipids in 12 months    3. Essential hypertension  Well-controlled  Continue losartan    4. Long-term insulin use (HCC)  Patient is on long-term insulin therapy due to type 1 diabetes      Return in about 3 months (around 6/3/2021).      Thank you kindly for allowing me to participate in the diabetes care plan for this patient.    Constantion Vazquez MD, [facility], [facility]  07/10/20    CC:   Tamie Siddiqi M.D.

## 2021-03-05 ENCOUNTER — IMMUNIZATION (OUTPATIENT)
Dept: FAMILY PLANNING/WOMEN'S HEALTH CLINIC | Facility: IMMUNIZATION CENTER | Age: 76
End: 2021-03-05
Attending: INTERNAL MEDICINE
Payer: MEDICARE

## 2021-03-05 DIAGNOSIS — Z23 ENCOUNTER FOR VACCINATION: Primary | ICD-10-CM

## 2021-03-05 PROCEDURE — 91301 MODERNA SARS-COV-2 VACCINE: CPT | Performed by: INTERNAL MEDICINE

## 2021-03-05 PROCEDURE — 0012A MODERNA SARS-COV-2 VACCINE: CPT | Performed by: INTERNAL MEDICINE

## 2021-07-06 RX ORDER — MELOXICAM 15 MG/1
15 TABLET ORAL
COMMUNITY
End: 2022-03-09

## 2021-07-06 RX ORDER — INSULIN DEGLUDEC 200 U/ML
INJECTION, SOLUTION SUBCUTANEOUS
COMMUNITY
End: 2021-07-07

## 2021-07-06 RX ORDER — INSULIN DEGLUDEC 100 U/ML
INJECTION, SOLUTION SUBCUTANEOUS
COMMUNITY
End: 2021-07-07

## 2021-07-07 ENCOUNTER — OFFICE VISIT (OUTPATIENT)
Dept: ENDOCRINOLOGY | Facility: MEDICAL CENTER | Age: 76
End: 2021-07-07
Attending: INTERNAL MEDICINE
Payer: MEDICARE

## 2021-07-07 VITALS
SYSTOLIC BLOOD PRESSURE: 118 MMHG | BODY MASS INDEX: 21.19 KG/M2 | HEIGHT: 68 IN | WEIGHT: 139.8 LBS | DIASTOLIC BLOOD PRESSURE: 62 MMHG | HEART RATE: 116 BPM | OXYGEN SATURATION: 95 %

## 2021-07-07 DIAGNOSIS — Z79.4 LONG-TERM INSULIN USE (HCC): ICD-10-CM

## 2021-07-07 DIAGNOSIS — I10 ESSENTIAL HYPERTENSION: ICD-10-CM

## 2021-07-07 DIAGNOSIS — E10.65 UNCONTROLLED TYPE 1 DIABETES MELLITUS WITH HYPERGLYCEMIA (HCC): ICD-10-CM

## 2021-07-07 DIAGNOSIS — E78.5 DYSLIPIDEMIA: ICD-10-CM

## 2021-07-07 LAB
HBA1C MFR BLD: 7.3 % (ref 0–5.6)
INT CON NEG: NEGATIVE
INT CON POS: POSITIVE

## 2021-07-07 PROCEDURE — 99214 OFFICE O/P EST MOD 30 MIN: CPT | Performed by: INTERNAL MEDICINE

## 2021-07-07 PROCEDURE — 83036 HEMOGLOBIN GLYCOSYLATED A1C: CPT | Performed by: INTERNAL MEDICINE

## 2021-07-07 PROCEDURE — 99212 OFFICE O/P EST SF 10 MIN: CPT | Performed by: INTERNAL MEDICINE

## 2021-07-07 ASSESSMENT — PATIENT HEALTH QUESTIONNAIRE - PHQ9: CLINICAL INTERPRETATION OF PHQ2 SCORE: 0

## 2021-07-07 NOTE — PROGRESS NOTES
CHIEF COMPLAINT: Patient is here for follow up of Type 1 Diabetes Mellitus    HPI:     Kymberly Vuong is a 75 y.o. female with Type 1 Diabetes Mellitus here for follow up.    Labs from 7/7/2021 show Hba1c is 7.3%  Labs from  2/16/2021 show HbA1c was 7.6%  Labs from 11/30/2020 show HbA1c was 8.2%  Labs from 7/10/2020  show HbA1c was 7.7%  Previously her A1c was elevated at 8.5%    She was previously seen by the PA.    She has had type 1 diabetes for over 50 years.  She does not like wearing a glucose sensor or pump.   She prefers multiple daily insulin injections          She is taking Tresiba 22 units daily, Humalog with a carb ratio of 1 unit for every 10g (averaging 5-6 units per meal)  her correction scale is 1 unit for every 20 above a target sugar of 130      She fell in June in her garden after tripping over her garden hose and she sprained her right knee.  She received a steroid injection which caused temporary hyperglycemia  She is still recovering from her right knee strain and is undergoing physical therapy and is on meloxicam  She is no longer getting steroid injections but is getting steroid topical gel      She has hyperlipidemia and is taking simvastatin 20 mg daily  She is tolerating this well  LDL-C was 98 on Feb 2021        She has essential hypertension and is taking losartan 50 mg daily  Her blood pressure today is well controlled  She is tolerating her medications fairly well  She doesn't have microalbuminuria  on 2/16/2021      She has an eye exam appt on July 2021        BG Diary:  She uses a Relion meter and tests 4 times a day Ave BG 30     Weight has been stable    Diabetes Complications   Retinopathy: No known retinopathy.  Last eye exam: June 2020 at [facility]  Neuropathy: Denies paresthesias or numbness in hands or feet. Denies any foot wounds.  Exercise: Minimal.  Diet: Fair.  Patient's medications, allergies, and social histories were reviewed and updated as  "appropriate.    ROS:     CONS:     No fever, no chills   EYES:     No diplopia, no blurry vision   CV:           No chest pain, no palpitations   PULM:     No SOB, no cough, no hemoptysis.   GI:            No nausea, no vomiting, no diarrhea, no constipation   ENDO:     No polyuria, no polydipsia, no heat intolerance, no cold intolerance       Past Medical History:  Problem List:  2020-07: Long-term insulin use ([facility])  2016-07: Vitamin D insufficiency  2015-02: Encounter for long-term (current) use of insulin ([facility])  2014-02: Uncontrolled type 1 diabetes mellitus with hyperglycemia   ([facility])  2014-02: Essential hypertension  2014-02: Dyslipidemia  2014-02: Albuminuria manifested in a patient with type 1 DM      Past Surgical History:  Past Surgical History:   Procedure Laterality Date   • CATARACT PHACO WITH IOL  3/10/2009    Performed by VALE HAND at [facility]   • CATARACT PHACO WITH IOL  2/24/2009    Performed by VALE HAND at [facility]        Allergies:  Patient has no known allergies.     Social History:  Social History     Tobacco Use   • Smoking status: Never Smoker   • Smokeless tobacco: Never Used   Vaping Use   • Vaping Use: Never used   Substance Use Topics   • Alcohol use: Yes     Comment: wine socially   • Drug use: No        Family History:   family history includes Cancer in her father and sister.      PHYSICAL EXAM:   OBJECTIVE:  Vital signs: /62 (BP Location: Left arm, Patient Position: Sitting, BP Cuff Size: Adult)   Pulse (!) 116   Ht 1.715 m (5' 7.5\")   Wt 63.4 kg (139 lb 12.8 oz)   SpO2 95%   BMI 21.57 kg/m²   GENERAL: Well-developed, well-nourished in no apparent distress.   EYE:  No ocular asymmetry, PERRLA  HENT: Pink, moist mucous membranes.    NECK: No thyromegaly.   CARDIOVASCULAR:  No murmurs  LUNGS: Clear breath sounds  ABDOMEN: Soft, nontender   EXTREMITIES: No clubbing, cyanosis, or edema.   NEUROLOGICAL: No gross focal motor " abnormalities   LYMPH: No cervical adenopathy palpated.   SKIN: No rashes, lesions.   Monofilament testing with a 10 gram force: sensation: intact bilaterally  Visual Inspection: Feet without maceration, ulcers, or fissures.  Pedal pulses: intact bilaterally      Labs:  Lab Results   Component Value Date/Time    HBA1C 7.3 (A) 07/07/2021 10:50 AM        Lab Results   Component Value Date/Time    WBC 3.8 (L) 04/25/2007 10:56 AM    RBC 4.71 04/25/2007 10:56 AM    HEMOGLOBIN 15.2 04/25/2007 10:56 AM    MCV 98.2 (H) 04/25/2007 10:56 AM    MCH 32.3 04/25/2007 10:56 AM    MCHC 32.9 04/25/2007 10:56 AM    RDW 13.5 04/25/2007 10:56 AM    MPV 7.0 04/25/2007 10:56 AM       Lab Results   Component Value Date/Time    SODIUM 141 02/16/2021 05:09 AM    SODIUM 132 (L) 04/25/2007 10:56 AM    POTASSIUM 4.5 02/16/2021 05:09 AM    POTASSIUM 3.3 (L) 04/25/2007 10:56 AM    CHLORIDE 102 02/16/2021 05:09 AM    CHLORIDE 98 04/25/2007 10:56 AM    CO2 24 02/16/2021 05:09 AM    CO2 26 04/25/2007 10:56 AM    GLUCOSE 158 (H) 02/16/2021 05:09 AM    GLUCOSE 211 (H) 04/25/2007 10:56 AM    BUN 16 02/16/2021 05:09 AM    BUN 13 04/25/2007 10:56 AM    CREATININE 0.66 02/16/2021 05:09 AM    CREATININE 0.62 06/07/2013 12:00 AM    CALCIUM 9.0 02/16/2021 05:09 AM    CALCIUM 8.7 04/25/2007 10:56 AM    ASTSGOT 18 02/16/2021 05:09 AM    ALTSGPT 13 02/16/2021 05:09 AM    TBILIRUBIN 0.4 02/16/2021 05:09 AM    ALBUMIN 4.0 02/16/2021 05:09 AM    TOTPROTEIN 6.7 02/16/2021 05:09 AM    GLOBULIN 2.7 02/16/2021 05:09 AM    AGRATIO 1.5 02/16/2021 05:09 AM       Lab Results   Component Value Date/Time    CHOLSTRLTOT 186 04/04/2018 0853    TRIGLYCERIDE 102 04/04/2018 0853    HDL 62 04/04/2018 0853     (H) 04/04/2018 0853       Lab Results   Component Value Date/Time    MICROALBCALC 8.5 06/12/2017 08:41 AM    MICROALBCALC 89.8 06/07/2013 12:00 AM    MICRALB 25.5 02/16/2021 05:09 AM        No results found for: TSHULTRASEN  No results found for: FREEDIR  No results  found for: FREET3  No results found for: THYSTIMIG        ASSESSMENT/PLAN:     1. Uncontrolled type 1 diabetes mellitus with hyperglycemia (HCC)  Uncontrolled secondary to hyperglycemia  A1c is better at 7.3%  Continue Tresiba 22 units daily  continue a carb ratio of 1:10  Continue correction scale of 1 unit for every 20 above a target sugar 130  I gave her samples of Fiasp to try  Follow-up with diabetes nurse in 3 months see me again in 6 months    2. Dyslipidemia  Controlled   Continue simvastatin   Repeat fasting lipids next year    3. Essential hypertension  Well-controlled  Continue losartan  Repeat urine microalbumin next year    4. Long-term insulin use (HCC)  Patient is on long-term insulin therapy due to type 1 diabetes      Return in about 3 months (around 10/7/2021).      Thank you kindly for allowing me to participate in the diabetes care plan for this patient.    Constantino Vazquez MD, FACE, [facility]  07/10/20    CC:   Tamie Siddiqi M.D.

## 2021-11-09 ENCOUNTER — NON-PROVIDER VISIT (OUTPATIENT)
Dept: ENDOCRINOLOGY | Facility: MEDICAL CENTER | Age: 76
End: 2021-11-09
Attending: INTERNAL MEDICINE
Payer: MEDICARE

## 2021-11-09 VITALS
OXYGEN SATURATION: 96 % | WEIGHT: 136 LBS | DIASTOLIC BLOOD PRESSURE: 62 MMHG | HEART RATE: 100 BPM | BODY MASS INDEX: 20.61 KG/M2 | HEIGHT: 68 IN | SYSTOLIC BLOOD PRESSURE: 110 MMHG

## 2021-11-09 DIAGNOSIS — E10.65 UNCONTROLLED TYPE 1 DIABETES MELLITUS WITH HYPERGLYCEMIA (HCC): ICD-10-CM

## 2021-11-09 PROCEDURE — 99212 OFFICE O/P EST SF 10 MIN: CPT | Performed by: INTERNAL MEDICINE

## 2021-11-09 NOTE — PROGRESS NOTES
RN-CDE Note    Subjective:   Endocrinology Clinic Progress Note  PCP: Tamie Siddiqi M.D.    HPI:  Kymberly Vuong is a 76 y.o. old patient who is seen today for review of Type 1 Diabetes.  Recent changes in health: She is feeling good.  She broke her toe in September and it has healed good.  Had a steroid injection and gel injections into knee.  DM:   Last A1c:   Lab Results   Component Value Date/Time    HBA1C 7.3 (A) 07/07/2021 10:50 AM      Previous A1c was 7.3 on 7/7/21  A1C GOAL: < 7    Diabetes Medications:   Tresiba 22 units daily  Humalog 1:10 plus 1:20>130 correction ( 4-5 units before meals)      Exercise: Going to physical therapy for knee  Diet: carbohydrate counting.  Patient's body mass index is unknown because there is no height or weight on file. Exercise and nutrition counseling were performed at this visit.    Glucose monitoring frequency: Testing blood sugars 4 times daily  70's-220  Hypoglycemic episodes: yes - can go into the 60's  Last Retinal Exam: on file and up-to-date  Daily Foot Exam: Yes   Foot Exam:  Monofilament: done  Monofilament testing with a 10 gram force: sensation intact: intact bilaterally  Visual Inspection: Feet without maceration, ulcers, fissures.  Pedal pulses: intact bilaterally   Lab Results   Component Value Date/Time    MICROALBCALC 8.5 06/12/2017 08:41 AM    MICROALBCALC 89.8 06/07/2013 12:00 AM    MICRALB 25.5 02/16/2021 05:09 AM     She  reports that she has never smoked. She has never used smokeless tobacco.      Plan:     Discussed and educated on:   - All medications, side effects and compliance (discussed carefully)  - Annual eye examinations at Ophthalmology  - Home glucose monitoring emphasized  - Weight control and daily exercise    Recommended medication changes: She will trial Lyumjev since she feels the FIASP worked better then the Humalog.  FIASP was not on her formulary.  She will try going up a unit at breakfast and adjust as needed before meals.   She will follow up with Dr. Vazquez in 4 months.

## 2022-03-09 ENCOUNTER — TELEPHONE (OUTPATIENT)
Dept: ENDOCRINOLOGY | Facility: MEDICAL CENTER | Age: 77
End: 2022-03-09
Payer: COMMERCIAL

## 2022-03-09 ENCOUNTER — OFFICE VISIT (OUTPATIENT)
Dept: ENDOCRINOLOGY | Facility: MEDICAL CENTER | Age: 77
End: 2022-03-09
Attending: INTERNAL MEDICINE
Payer: MEDICARE

## 2022-03-09 VITALS
WEIGHT: 138.6 LBS | HEART RATE: 117 BPM | OXYGEN SATURATION: 96 % | DIASTOLIC BLOOD PRESSURE: 70 MMHG | HEIGHT: 68 IN | BODY MASS INDEX: 21.01 KG/M2 | SYSTOLIC BLOOD PRESSURE: 122 MMHG

## 2022-03-09 DIAGNOSIS — Z79.4 LONG-TERM INSULIN USE (HCC): ICD-10-CM

## 2022-03-09 DIAGNOSIS — E10.65 UNCONTROLLED TYPE 1 DIABETES MELLITUS WITH HYPERGLYCEMIA (HCC): ICD-10-CM

## 2022-03-09 DIAGNOSIS — I10 ESSENTIAL HYPERTENSION: ICD-10-CM

## 2022-03-09 DIAGNOSIS — E55.9 VITAMIN D DEFICIENCY: ICD-10-CM

## 2022-03-09 DIAGNOSIS — E13.9 DIABETES 1.5, MANAGED AS TYPE 1 (HCC): ICD-10-CM

## 2022-03-09 DIAGNOSIS — E78.5 DYSLIPIDEMIA: ICD-10-CM

## 2022-03-09 LAB
HBA1C MFR BLD: 7.4 % (ref 0–5.6)
INT CON NEG: NEGATIVE
INT CON POS: POSITIVE

## 2022-03-09 PROCEDURE — 99214 OFFICE O/P EST MOD 30 MIN: CPT | Performed by: INTERNAL MEDICINE

## 2022-03-09 PROCEDURE — 99212 OFFICE O/P EST SF 10 MIN: CPT | Performed by: INTERNAL MEDICINE

## 2022-03-09 PROCEDURE — 83036 HEMOGLOBIN GLYCOSYLATED A1C: CPT | Performed by: INTERNAL MEDICINE

## 2022-03-09 RX ORDER — INSULIN DEGLUDEC 100 U/ML
22 INJECTION, SOLUTION SUBCUTANEOUS
Qty: 45 ML | Refills: 3 | Status: SHIPPED | OUTPATIENT
Start: 2022-03-09

## 2022-03-09 RX ORDER — INSULIN LISPRO-AABC 100 [IU]/ML
4-10 INJECTION, SOLUTION SUBCUTANEOUS
Qty: 45 ML | Refills: 3 | Status: SHIPPED | OUTPATIENT
Start: 2022-03-09

## 2022-03-09 RX ORDER — INSULIN DEGLUDEC 100 U/ML
22 INJECTION, SOLUTION SUBCUTANEOUS
Qty: 45 ML | Refills: 3 | Status: SHIPPED | OUTPATIENT
Start: 2022-03-09 | End: 2022-03-09 | Stop reason: SDUPTHER

## 2022-03-09 RX ORDER — INSULIN LISPRO-AABC 100 [IU]/ML
4-10 INJECTION, SOLUTION SUBCUTANEOUS
Qty: 45 ML | Refills: 3 | Status: SHIPPED | OUTPATIENT
Start: 2022-03-09 | End: 2022-03-09 | Stop reason: SDUPTHER

## 2022-03-09 ASSESSMENT — PATIENT HEALTH QUESTIONNAIRE - PHQ9: CLINICAL INTERPRETATION OF PHQ2 SCORE: 0

## 2022-03-09 NOTE — TELEPHONE ENCOUNTER
Patient came in for appointment today. Informed patient that we need an updated referral from their PCP and to request this. Patient verbalized understanding.

## 2022-03-09 NOTE — PROGRESS NOTES
CHIEF COMPLAINT: Patient is here for follow up of Type 1 Diabetes Mellitus    HPI:     Kymberly Vuong is a 75 y.o. female with Type 1 Diabetes Mellitus here for follow up.      Labs from 3/9/2022 show Hba1c is 7.4%  Labs from 7/7/2021 show Hba1c was 7.3%  Labs from  2/16/2021 show HbA1c was 7.6%  Labs from 11/30/2020 show HbA1c was 8.2%  Labs from 7/10/2020  show HbA1c was 7.7%  Previously her A1c was elevated at 8.5%    She was previously seen by the PA.    She has had type 1 diabetes for over 50 years.    She does not like wearing a glucose sensor or insulin pump.     She prefers multiple daily insulin injections  She has tried and failed Humalog pens        She is taking Tresiba 22 units daily, Lyumjev with a carb ratio of 1 unit for every 10g (averaging 5-6 units per meal)  her correction scale is 1 unit for every 20 above a target sugar of 130      Her foot is better and she is recovered  No more steroid injections  She has tried Lyumjev and Fiasp and she likes both  However Lyumjev is covered under her pharmacy plan and is part of the formulary benefits        She has hyperlipidemia and is taking simvastatin 20 mg daily  She is tolerating this well  LDL-C was 98 on Feb 2021        She has essential hypertension and is taking losartan 50 mg daily  Her blood pressure today is well controlled  She is tolerating her medications fairly well  She doesn't have microalbuminuria    UACR was less than  2/16/2021      She had an eye exam appt on July 2021   But we dont have records        BG Diary:  She uses a Relion meter and tests 4 times a day Ave BG 30     Weight has been stable    Diabetes Complications   Retinopathy: No known retinopathy.  Last eye exam: June 2020 at [facility]  Neuropathy: Denies paresthesias or numbness in hands or feet. Denies any foot wounds.  Exercise: Minimal.  Diet: Fair.  Patient's medications, allergies, and social histories were reviewed and updated as  "appropriate.    ROS:     CONS:     No fever, no chills   EYES:     No diplopia, no blurry vision   CV:           No chest pain, no palpitations   PULM:     No SOB, no cough, no hemoptysis.   GI:            No nausea, no vomiting, no diarrhea, no constipation   ENDO:     No polyuria, no polydipsia, no heat intolerance, no cold intolerance       Past Medical History:  Problem List:  2020-07: Long-term insulin use ([facility])  2016-07: Vitamin D insufficiency  2015-02: Encounter for long-term (current) use of insulin ([facility])  2014-02: Uncontrolled type 1 diabetes mellitus with hyperglycemia   ([facility])  2014-02: Essential hypertension  2014-02: Dyslipidemia  2014-02: Albuminuria manifested in a patient with type 1 DM      Past Surgical History:  Past Surgical History:   Procedure Laterality Date   • CATARACT PHACO WITH IOL  3/10/2009    Performed by VALE HAND [facility]   • CATARACT PHACO WITH IOL  2/24/2009    Performed by VALE HAND at [facility]        Allergies:  Patient has no known allergies.     Social History:  Social History     Tobacco Use   • Smoking status: Never Smoker   • Smokeless tobacco: Never Used   Vaping Use   • Vaping Use: Never used   Substance Use Topics   • Alcohol use: Yes     Comment: wine socially   • Drug use: No        Family History:   family history includes Cancer in her father and sister.      PHYSICAL EXAM:   OBJECTIVE:  Vital signs: /70 (BP Location: Right arm, Patient Position: Sitting, BP Cuff Size: Adult)   Pulse (!) 117   Ht 1.727 m (5' 8\")   Wt 62.9 kg (138 lb 9.6 oz)   SpO2 96%   BMI 21.07 kg/m²   GENERAL: Well-developed, well-nourished in no apparent distress.   EYE:  No ocular asymmetry, PERRLA  HENT: Pink, moist mucous membranes.    NECK: No thyromegaly.   CARDIOVASCULAR:  No murmurs  LUNGS: Clear breath sounds  ABDOMEN: Soft, nontender   EXTREMITIES: No clubbing, cyanosis, or edema.   NEUROLOGICAL: No gross focal motor " abnormalities   LYMPH: No cervical adenopathy palpated.   SKIN: No rashes, lesions.   Monofilament testing with a 10 gram force: sensation: intact bilaterally  Visual Inspection: Feet without maceration, ulcers, or fissures.  Pedal pulses: intact bilaterally      Labs:  Lab Results   Component Value Date/Time    HBA1C 7.4 (A) 03/09/2022 12:49 PM        Lab Results   Component Value Date/Time    WBC 3.8 (L) 04/25/2007 10:56 AM    RBC 4.71 04/25/2007 10:56 AM    HEMOGLOBIN 15.2 04/25/2007 10:56 AM    MCV 98.2 (H) 04/25/2007 10:56 AM    MCH 32.3 04/25/2007 10:56 AM    MCHC 32.9 04/25/2007 10:56 AM    RDW 13.5 04/25/2007 10:56 AM    MPV 7.0 04/25/2007 10:56 AM       Lab Results   Component Value Date/Time    SODIUM 141 02/16/2021 05:09 AM    SODIUM 132 (L) 04/25/2007 10:56 AM    POTASSIUM 4.5 02/16/2021 05:09 AM    POTASSIUM 3.3 (L) 04/25/2007 10:56 AM    CHLORIDE 102 02/16/2021 05:09 AM    CHLORIDE 98 04/25/2007 10:56 AM    CO2 24 02/16/2021 05:09 AM    CO2 26 04/25/2007 10:56 AM    GLUCOSE 158 (H) 02/16/2021 05:09 AM    GLUCOSE 211 (H) 04/25/2007 10:56 AM    BUN 16 02/16/2021 05:09 AM    BUN 13 04/25/2007 10:56 AM    CREATININE 0.66 02/16/2021 05:09 AM    CREATININE 0.62 06/07/2013 12:00 AM    CALCIUM 9.0 02/16/2021 05:09 AM    CALCIUM 8.7 04/25/2007 10:56 AM    ASTSGOT 18 02/16/2021 05:09 AM    ALTSGPT 13 02/16/2021 05:09 AM    TBILIRUBIN 0.4 02/16/2021 05:09 AM    ALBUMIN 4.0 02/16/2021 05:09 AM    TOTPROTEIN 6.7 02/16/2021 05:09 AM    GLOBULIN 2.7 02/16/2021 05:09 AM    AGRATIO 1.5 02/16/2021 05:09 AM       Lab Results   Component Value Date/Time    CHOLSTRLTOT 186 04/04/2018 0853    TRIGLYCERIDE 102 04/04/2018 0853    HDL 62 04/04/2018 0853     (H) 04/04/2018 0853       Lab Results   Component Value Date/Time    MICROALBCALC 8.5 06/12/2017 08:41 AM    MICROALBCALC 89.8 06/07/2013 12:00 AM    MICRALB 25.5 02/16/2021 05:09 AM        No results found for: TSHULTRASEN  No results found for: FREEDIR  No results  found for: FREET3  No results found for: THYSTIMIG        ASSESSMENT/PLAN:     1. Uncontrolled type 1 diabetes mellitus with hyperglycemia (HCC)  Fair control  A1c is stable at 7.4%  Continue Tresiba 22 units daily  I am ordering Lyumjev for her   continue a carb ratio of 1:10  Continue correction scale of 1 unit for every 20 above a target sugar 130  Follow-up with diabetes nurse in 3 months see me again in 6 months    2. Dyslipidemia  Controlled   Continue simvastatin   I want her to get labs this week to check her fasting lipids    3. Essential hypertension  Well-controlled  Continue losartan  I want her to get labs this week to check her urine microalbumin    4. Long-term insulin use (HCC)  Patient is on long-term insulin therapy due to type 1 diabetes      Return in about 3 months (around 6/9/2022).      Thank you kindly for allowing me to participate in the diabetes care plan for this patient.    Constantino Vazquez MD, FACE, [facility]  07/10/20    CC:   Tamie Siddiqi M.D.

## 2022-04-02 LAB
25(OH)D3+25(OH)D2 SERPL-MCNC: 41.9 NG/ML (ref 30–100)
ALBUMIN SERPL-MCNC: 4 G/DL (ref 3.7–4.7)
ALBUMIN/GLOB SERPL: 1.6 {RATIO} (ref 1.2–2.2)
ALP SERPL-CCNC: 87 IU/L (ref 44–121)
ALT SERPL-CCNC: 19 IU/L (ref 0–32)
AST SERPL-CCNC: 21 IU/L (ref 0–40)
BASOPHILS # BLD AUTO: 0 X10E3/UL (ref 0–0.2)
BASOPHILS NFR BLD AUTO: 1 %
BILIRUB SERPL-MCNC: 0.3 MG/DL (ref 0–1.2)
BUN SERPL-MCNC: 17 MG/DL (ref 8–27)
BUN/CREAT SERPL: 28 (ref 12–28)
CALCIUM SERPL-MCNC: 9.2 MG/DL (ref 8.7–10.3)
CHLORIDE SERPL-SCNC: 100 MMOL/L (ref 96–106)
CHOLEST SERPL-MCNC: 189 MG/DL (ref 100–199)
CO2 SERPL-SCNC: 24 MMOL/L (ref 20–29)
CREAT SERPL-MCNC: 0.6 MG/DL (ref 0.57–1)
EGFRCR SERPLBLD CKD-EPI 2021: 93 ML/MIN/1.73
ENDOMYSIUM IGA SER QL: NEGATIVE
EOSINOPHIL # BLD AUTO: 0.1 X10E3/UL (ref 0–0.4)
EOSINOPHIL NFR BLD AUTO: 2 %
ERYTHROCYTE [DISTWIDTH] IN BLOOD BY AUTOMATED COUNT: 12 % (ref 11.7–15.4)
GLOBULIN SER CALC-MCNC: 2.5 G/DL (ref 1.5–4.5)
GLUCOSE SERPL-MCNC: 164 MG/DL (ref 65–99)
HCT VFR BLD AUTO: 42 % (ref 34–46.6)
HDLC SERPL-MCNC: 58 MG/DL
HGB BLD-MCNC: 13.9 G/DL (ref 11.1–15.9)
IGA SERPL-MCNC: 280 MG/DL (ref 64–422)
IMM GRANULOCYTES # BLD AUTO: 0 X10E3/UL (ref 0–0.1)
IMM GRANULOCYTES NFR BLD AUTO: 0 %
IMMATURE CELLS  115398: NORMAL
LABORATORY COMMENT REPORT: ABNORMAL
LDLC SERPL CALC-MCNC: 108 MG/DL (ref 0–99)
LYMPHOCYTES # BLD AUTO: 2.1 X10E3/UL (ref 0.7–3.1)
LYMPHOCYTES NFR BLD AUTO: 39 %
MCH RBC QN AUTO: 31.4 PG (ref 26.6–33)
MCHC RBC AUTO-ENTMCNC: 33.1 G/DL (ref 31.5–35.7)
MCV RBC AUTO: 95 FL (ref 79–97)
MONOCYTES # BLD AUTO: 0.4 X10E3/UL (ref 0.1–0.9)
MONOCYTES NFR BLD AUTO: 7 %
MORPHOLOGY BLD-IMP: NORMAL
NEUTROPHILS # BLD AUTO: 2.7 X10E3/UL (ref 1.4–7)
NEUTROPHILS NFR BLD AUTO: 51 %
NRBC BLD AUTO-RTO: NORMAL %
PLATELET # BLD AUTO: 234 X10E3/UL (ref 150–450)
POTASSIUM SERPL-SCNC: 4.8 MMOL/L (ref 3.5–5.2)
PROT SERPL-MCNC: 6.5 G/DL (ref 6–8.5)
RBC # BLD AUTO: 4.42 X10E6/UL (ref 3.77–5.28)
SODIUM SERPL-SCNC: 139 MMOL/L (ref 134–144)
TRIGL SERPL-MCNC: 131 MG/DL (ref 0–149)
TSH SERPL DL<=0.005 MIU/L-ACNC: 1.14 UIU/ML (ref 0.45–4.5)
TTG IGA SER-ACNC: <2 U/ML (ref 0–3)
VLDLC SERPL CALC-MCNC: 23 MG/DL (ref 5–40)
WBC # BLD AUTO: 5.3 X10E3/UL (ref 3.4–10.8)

## 2022-04-03 DIAGNOSIS — E10.65 UNCONTROLLED TYPE 1 DIABETES MELLITUS WITH HYPERGLYCEMIA (HCC): ICD-10-CM

## 2022-04-04 ENCOUNTER — TELEPHONE (OUTPATIENT)
Dept: ENDOCRINOLOGY | Facility: MEDICAL CENTER | Age: 77
End: 2022-04-04
Payer: COMMERCIAL

## 2022-04-04 DIAGNOSIS — E78.5 DYSLIPIDEMIA: ICD-10-CM

## 2022-04-04 RX ORDER — ATORVASTATIN CALCIUM 40 MG/1
40 TABLET, FILM COATED ORAL DAILY
Qty: 90 TABLET | Refills: 2 | Status: SHIPPED | OUTPATIENT
Start: 2022-04-04

## 2022-04-04 NOTE — TELEPHONE ENCOUNTER
Called patient her LDL cholesterol suboptimal we are replacing simvastatin with atorvastatin 40 mg    The lab did not complete her urine microalbumin so we will mail another order for her      Dear Jones can we please mail her the order for the urine microalbumin

## 2022-06-15 ENCOUNTER — APPOINTMENT (OUTPATIENT)
Dept: ENDOCRINOLOGY | Facility: MEDICAL CENTER | Age: 77
End: 2022-06-15
Attending: INTERNAL MEDICINE
Payer: MEDICARE

## 2022-11-09 ENCOUNTER — PATIENT MESSAGE (OUTPATIENT)
Dept: HEALTH INFORMATION MANAGEMENT | Facility: OTHER | Age: 77
End: 2022-11-09

## 2024-01-08 ENCOUNTER — HOSPITAL ENCOUNTER (OUTPATIENT)
Dept: RADIOLOGY | Facility: MEDICAL CENTER | Age: 79
End: 2024-01-08
Attending: ORTHOPAEDIC SURGERY
Payer: MEDICARE

## 2024-01-08 DIAGNOSIS — M54.59 MECHANICAL LOW BACK PAIN: ICD-10-CM

## 2024-01-08 PROCEDURE — 72148 MRI LUMBAR SPINE W/O DYE: CPT

## 2025-03-07 ENCOUNTER — PHYSICAL THERAPY (OUTPATIENT)
Dept: PHYSICAL THERAPY | Facility: MEDICAL CENTER | Age: 80
End: 2025-03-07
Attending: FAMILY MEDICINE
Payer: MEDICARE

## 2025-03-07 DIAGNOSIS — R54 AGE-RELATED PHYSICAL DEBILITY: ICD-10-CM

## 2025-03-07 DIAGNOSIS — M50.30 DDD (DEGENERATIVE DISC DISEASE), CERVICAL: ICD-10-CM

## 2025-03-07 DIAGNOSIS — R29.6 REPEATED FALLS: ICD-10-CM

## 2025-03-07 DIAGNOSIS — M17.11 LOCALIZED OSTEOARTHRITIS OF RIGHT KNEE: ICD-10-CM

## 2025-03-07 PROCEDURE — 97110 THERAPEUTIC EXERCISES: CPT

## 2025-03-07 PROCEDURE — 97163 PT EVAL HIGH COMPLEX 45 MIN: CPT

## 2025-03-07 ASSESSMENT — ACTIVITIES OF DAILY LIVING (ADL): POOR_BALANCE: 1

## 2025-03-10 NOTE — OP THERAPY DAILY TREATMENT
"  Outpatient Physical Therapy  DAILY TREATMENT     [facility] Outpatient Physical Therapy  [address]  [place] [address]  Phone:  [phone]  Fax:  [phone]    Date: 03/11/2025    Patient: Kymberly Vuong  YOB: 1945  MRN: [id number]     Time Calculation    Start time: 1004  Stop time: 1100 Time Calculation (min): 56 minutes         Chief Complaint: Weakness    Visit #: 2    SUBJECTIVE:  No new complaints. Found the HEP challenging, but appropriate and I'm getting better at it.     OBJECTIVE:      Therapeutic Exercises (CPT 78448):     1. NuStep, L3 x 7 min.    2. SL clamshell, standard x 10, reverse x 10 (light manual support    3. SL hip abd, x 10 ea, more challenging on the L. Requries manual assist    4. Standard bridge, x 10, verbal cues to move knees to neutral through lateral hip recuitment. Lateral wobble through trunk    5. Seated abdominal bracing, ball 5\" x 15    6. Seated trunk anti rotation, pink x 10 ea    7. Seated clamshell, pink 5\" hold and 2\" eccentric x 15    8. STS from 24\" surface with TB around knees, x 5 reps    9. Ambulation with 4WW x 30 feet    10. //bars    11. - side steps, 2x5 feet, more challenging moving to the L    12. - TKE, pink 5\" x 10 ea    13. - Heel/toe rocks, x 10 ea, unable to clear floor for DF    14. - Standing without UEs+ perturbations, 2x30\"    15. Shuttle, Squat B 3C x 15, SL 2C x 10 ea      Therapeutic Exercise Summary: Access Code: [email or web address]  URL: [email or web address]/  Date: 03/07/2025  Prepared by:     Exercises  - Supine Bridge  - 3-4 x daily - 5-10 reps - 5 sec hold  - Clamshell  - 3-4 x daily - 5-10 reps  - Sidelying Hip Abduction  - 3-4 x daily - 5-10 reps    Time-based treatments/modalities:    Physical Therapy Timed Treatment Charges  Therapeutic exercise minutes (CPT 63754): 56 minutes    ASSESSMENT:   Response to treatment: Had been doing her HEP on only the R side, but has " opportunity to develop strength bilaterally, so will complete on both sides ongoing. Quite weak in the midline and lateral hips, resulting in fwd trunk and genu valgus patterning. Substantial crepitus in the R knee during STS. Poor quad control and frequent knee hyperextension. Given pt's high motivation, anticipate positive response. Will continue to benefit from skilled PT.    PLAN/RECOMMENDATIONS:   Plan for treatment: therapy treatment to continue next visit.  Planned interventions for next visit: continue with current treatment.

## 2025-03-11 ENCOUNTER — PHYSICAL THERAPY (OUTPATIENT)
Dept: PHYSICAL THERAPY | Facility: MEDICAL CENTER | Age: 80
End: 2025-03-11
Attending: FAMILY MEDICINE
Payer: MEDICARE

## 2025-03-11 DIAGNOSIS — R29.6 REPEATED FALLS: ICD-10-CM

## 2025-03-11 DIAGNOSIS — M50.30 DDD (DEGENERATIVE DISC DISEASE), CERVICAL: ICD-10-CM

## 2025-03-11 DIAGNOSIS — R54 AGE-RELATED PHYSICAL DEBILITY: ICD-10-CM

## 2025-03-11 DIAGNOSIS — M17.11 LOCALIZED OSTEOARTHRITIS OF RIGHT KNEE: ICD-10-CM

## 2025-03-11 PROCEDURE — 97110 THERAPEUTIC EXERCISES: CPT

## 2025-03-17 NOTE — OP THERAPY DAILY TREATMENT
"  Outpatient Physical Therapy  DAILY TREATMENT     [facility] Outpatient Physical Therapy  [address]  [place] [address]  Phone:  [phone]  Fax:  [phone]    Date: 03/18/2025    Patient: Kymberly Vuong  YOB: 1945  MRN: [id number]     Time Calculation    Start time: 1014  Stop time: 1100 Time Calculation (min): 46 minutes         Chief Complaint: Weakness    Visit #: 3    SUBJECTIVE:  My energy was fatigued after last visit, but I wasn't too sore. I forgot my knee brace today. The HEP is going well. The toughest one is the L hip abd, but I know it will take time.     OBJECTIVE:      Therapeutic Exercises (CPT 72014):     1. NuStep, L3 x 10 min.    2. seated HS curl, orange x 20 ea, loses eccentric control when fatigued    3. LAQ, 2# 2x10 ea    4. Standard bridge, 1x10, orange TB around knees to promote alignment. Unable to complete a 2nd set due to pain    5. SLR, x 10 ea, Tends to lift too high on the R, loses stability and has popping/shifting over the lateral hip    6. SL clamshell, 2x10, cues to maintain small ROM and reduce compensation    7. SL hip abd, x 10 ea, manual cues 50% to optmize trunk position    8. STS from 24\" surface with TB around knees, x 5 reps    9. Ambulation with 4WW x 30 feet    10. Shuttle, Squat B 4C x 20 (manual assist to block R knee hyperextension), SL 2.5C x 10 ea      Therapeutic Exercise Summary: Access Code: [email or web address]  URL: [email or web address]/  Date: 03/07/2025  Prepared by:     Exercises  - Supine Bridge  - 3-4 x daily - 5-10 reps - 5 sec hold  - Clamshell  - 3-4 x daily - 5-10 reps  - Sidelying Hip Abduction  - 3-4 x daily - 5-10 reps      Time-based treatments/modalities:    Physical Therapy Timed Treatment Charges  Therapeutic exercise minutes (CPT 48773): 44 minutes    ASSESSMENT:   Response to treatment: Tolerating therapy sessions well. No substantial flare up of joint pains. Better indep cuing " for R knee alignment and hip recruitment. Needs to regain substantial strength in both lateral hips, midline and R quad control from 20 deg to 0.     PLAN/RECOMMENDATIONS:   Plan for treatment: therapy treatment to continue next visit.  Planned interventions for next visit: continue with current treatment.

## 2025-03-18 ENCOUNTER — PHYSICAL THERAPY (OUTPATIENT)
Dept: PHYSICAL THERAPY | Facility: MEDICAL CENTER | Age: 80
End: 2025-03-18
Attending: FAMILY MEDICINE
Payer: MEDICARE

## 2025-03-18 DIAGNOSIS — R29.6 REPEATED FALLS: ICD-10-CM

## 2025-03-18 DIAGNOSIS — M50.30 DDD (DEGENERATIVE DISC DISEASE), CERVICAL: ICD-10-CM

## 2025-03-18 DIAGNOSIS — M17.11 LOCALIZED OSTEOARTHRITIS OF RIGHT KNEE: ICD-10-CM

## 2025-03-18 DIAGNOSIS — R54 AGE-RELATED PHYSICAL DEBILITY: ICD-10-CM

## 2025-03-18 PROCEDURE — 97110 THERAPEUTIC EXERCISES: CPT

## 2025-03-24 NOTE — OP THERAPY DAILY TREATMENT
Outpatient Physical Therapy  DAILY TREATMENT     [facility] Outpatient Physical Therapy  [address]  [place] [address]  Phone:  [phone]  Fax:  [phone]    Date: 03/28/2025    Patient: Kymberly Vuong  YOB: 1945  MRN: [id number]     Time Calculation    Start time: 1145  Stop time: 1230 Time Calculation (min): 45 minutes         Chief Complaint: Difficulty Walking    Visit #: 5    SUBJECTIVE:  I'm painful today around my R hip. It's been like that since the weather got cold again. I was doing so well when it was warm.     OBJECTIVE:      Therapeutic Exercises (CPT 83524):     1. NuStep, L2 x 10 min    2. Shuttle, squat 3C x 10, SL 1.5C x 10 ea    3. Seated bicep curl, 2# x 10    4. Seated row, Orange x 10    5. seated clam, pink x 20 ea    6. seated hip IR ball squeeze, x 20    7. Seated HS curl, orange x 20 ea    8. LAQ, 2# x 20 ea    9. Seated march, 2# x 20 ea      Therapeutic Exercise Summary: Access Code: [email or web address]  URL: [email or web address]/  Date: 03/07/2025  Prepared by:     Exercises  - Supine Bridge  - 3-4 x daily - 5-10 reps - 5 sec hold  - Clamshell  - 3-4 x daily - 5-10 reps  - Sidelying Hip Abduction  - 3-4 x daily - 5-10 reps      Time-based treatments/modalities:    Physical Therapy Timed Treatment Charges  Therapeutic exercise minutes (CPT 08926): 45 minutes    ASSESSMENT:   Response to treatment: Reduced tolerance for load, reps, standing overall today. Pt participatory, but low energy. Still able to complete tasks at lower level. Hoping to be able to resume standing/walking next visit.     PLAN/RECOMMENDATIONS:   Plan for treatment: therapy treatment to continue next visit.  Planned interventions for next visit: continue with current treatment.

## 2025-03-24 NOTE — OP THERAPY DAILY TREATMENT
"  Outpatient Physical Therapy  DAILY TREATMENT     [facility] Outpatient Physical Therapy  [address]  [place] [address]  Phone:  [phone]  Fax:  [phone]    Date: 03/25/2025    Patient: Kymberly Vuong  YOB: 1945  MRN: [id number]     Time Calculation    Start time: 1016  Stop time: 1101 Time Calculation (min): 45 minutes         Chief Complaint: Difficulty Walking    Visit #: 4    SUBJECTIVE:  I've been sore in my back and R hip, but that was true before I started. Sometimes the HEP increases this and sometimes it improves it, but overall I feel I am getting stronger. I know how important it is to keep moving.     OBJECTIVE:      Therapeutic Exercises (CPT 99552):     1. NuStep, L3 x 10 min    2. seated HS curl, orange x 20 ea    3. LAQ, 3# 2x10 ea    4. Standard bridge, 2x8, orange TB around knees to promote alignment.    5. SLR, x 15 ea, Better able to stay below the level of the R hip \"popping\"    6. BKFO, orange x 20 ea    7. SL hip abd and clamshell done at home    9. seated march, 3# x 20 ea, cues to maintain stability in the lateral R hip to avoid collapse when the L lifts    10. Shuttle, Squat B 4C x 20 (manual assist to block R knee hyperextension), SL 2.5C x 10 ea    11. Ambulation with 4WW, 1x50 feet, 1x max effort (90 feet)      Therapeutic Exercise Summary: Access Code: [email or web address]  URL: [email or web address]  Date: 03/07/2025  Prepared by:     Exercises  - Supine Bridge  - 3-4 x daily - 5-10 reps - 5 sec hold  - Clamshell  - 3-4 x daily - 5-10 reps  - Sidelying Hip Abduction  - 3-4 x daily - 5-10 reps      Time-based treatments/modalities:    Physical Therapy Timed Treatment Charges  Therapeutic exercise minutes (CPT 91776): 45 minutes    ASSESSMENT:   Response to treatment: More thoughtful in her control of R knee hyperextension during gait. Improved gait endurance compared to eval, but still below functional norms. Will " continue to benefit from skilled progressions to restore functional indep    PLAN/RECOMMENDATIONS:   Plan for treatment: therapy treatment to continue next visit.  Planned interventions for next visit: continue with current treatment.

## 2025-03-25 ENCOUNTER — PHYSICAL THERAPY (OUTPATIENT)
Dept: PHYSICAL THERAPY | Facility: MEDICAL CENTER | Age: 80
End: 2025-03-25
Attending: FAMILY MEDICINE
Payer: MEDICARE

## 2025-03-25 DIAGNOSIS — M17.11 LOCALIZED OSTEOARTHRITIS OF RIGHT KNEE: ICD-10-CM

## 2025-03-25 DIAGNOSIS — R54 AGE-RELATED PHYSICAL DEBILITY: ICD-10-CM

## 2025-03-25 DIAGNOSIS — M50.30 DDD (DEGENERATIVE DISC DISEASE), CERVICAL: ICD-10-CM

## 2025-03-25 DIAGNOSIS — R29.6 REPEATED FALLS: ICD-10-CM

## 2025-03-25 PROCEDURE — 97110 THERAPEUTIC EXERCISES: CPT

## 2025-03-28 ENCOUNTER — PHYSICAL THERAPY (OUTPATIENT)
Dept: PHYSICAL THERAPY | Facility: MEDICAL CENTER | Age: 80
End: 2025-03-28
Attending: FAMILY MEDICINE
Payer: MEDICARE

## 2025-03-28 DIAGNOSIS — R29.6 REPEATED FALLS: ICD-10-CM

## 2025-03-28 DIAGNOSIS — M17.11 LOCALIZED OSTEOARTHRITIS OF RIGHT KNEE: ICD-10-CM

## 2025-03-28 DIAGNOSIS — R54 AGE-RELATED PHYSICAL DEBILITY: ICD-10-CM

## 2025-03-28 DIAGNOSIS — M50.30 DDD (DEGENERATIVE DISC DISEASE), CERVICAL: ICD-10-CM

## 2025-03-28 PROCEDURE — 97110 THERAPEUTIC EXERCISES: CPT

## 2025-04-01 ENCOUNTER — PHYSICAL THERAPY (OUTPATIENT)
Dept: PHYSICAL THERAPY | Facility: MEDICAL CENTER | Age: 80
End: 2025-04-01
Attending: FAMILY MEDICINE
Payer: MEDICARE

## 2025-04-01 DIAGNOSIS — R54 AGE-RELATED PHYSICAL DEBILITY: ICD-10-CM

## 2025-04-01 DIAGNOSIS — M50.30 DDD (DEGENERATIVE DISC DISEASE), CERVICAL: ICD-10-CM

## 2025-04-01 DIAGNOSIS — R29.6 REPEATED FALLS: ICD-10-CM

## 2025-04-01 DIAGNOSIS — M17.11 LOCALIZED OSTEOARTHRITIS OF RIGHT KNEE: ICD-10-CM

## 2025-04-01 PROCEDURE — 97110 THERAPEUTIC EXERCISES: CPT

## 2025-04-01 NOTE — OP THERAPY DAILY TREATMENT
"  Outpatient Physical Therapy  DAILY TREATMENT     [facility] Outpatient Physical Therapy  [address]  [place] [address]  Phone:  [phone]  Fax:  [phone]    Date: 04/01/2025    Patient: Kymberly Vuong  YOB: 1945  MRN: [id number]     Time Calculation    Start time: 1015  Stop time: 1055 Time Calculation (min): 40 minutes         Chief Complaint: Difficulty Walking    Visit #: 6    SUBJECTIVE:  I'm much better today. In fact, I was feeling better by the afternoon after last visit. HEP going well. Bridges are better tolerated if kept to 5-10 reps. Ready to work today.     OBJECTIVE:      Therapeutic Exercises (CPT 46249):     1. NuStep, L2 x 5 min    2. Shuttle, NT    3. plyobox push, 20 feet., step too pattern, biased to UE push, but was able to get a few alternating steps    4. Gait with 4WW, 120 feet, SBA with 1 PT support due to LOB (tripped on R toe and caused R knee hyperextension)    5. seated clam, pink x 20 ea    6. standing HS curl (elbows on plyobox), 3# 2x10    7. seated abdominal ball press, 5\" x 20    8. LAQ, 3# x 20 ea    9. Seated march, 3# x 20 ea      Therapeutic Exercise Summary: Access Code: [email or web address]  URL: [email or web address]  Date: 03/07/2025  Prepared by:     Exercises  - Supine Bridge  - 3-4 x daily - 5-10 reps - 5 sec hold  - Clamshell  - 3-4 x daily - 5-10 reps  - Sidelying Hip Abduction  - 3-4 x daily - 5-10 reps      Time-based treatments/modalities:    Physical Therapy Timed Treatment Charges  Therapeutic exercise minutes (CPT 88773): 40 minutes    ASSESSMENT:   Response to treatment: Therex tolerance recovered in comparison to last session. This visit was well tolerated. Beginning to see increased walking distance compared to prior max effort and greater confidence in standing without close guarding. Plyobox push was challenging due to limitations in LE power, but overall successful enough to offer a good " opportunity. Continue to integrate.     PLAN/RECOMMENDATIONS:   Plan for treatment: therapy treatment to continue next visit.  Planned interventions for next visit: continue with current treatment.

## 2025-04-15 ENCOUNTER — PHYSICAL THERAPY (OUTPATIENT)
Dept: PHYSICAL THERAPY | Facility: MEDICAL CENTER | Age: 80
End: 2025-04-15
Attending: FAMILY MEDICINE
Payer: MEDICARE

## 2025-04-15 DIAGNOSIS — M17.11 LOCALIZED OSTEOARTHRITIS OF RIGHT KNEE: ICD-10-CM

## 2025-04-15 DIAGNOSIS — R29.6 REPEATED FALLS: ICD-10-CM

## 2025-04-15 DIAGNOSIS — M50.30 DDD (DEGENERATIVE DISC DISEASE), CERVICAL: ICD-10-CM

## 2025-04-15 DIAGNOSIS — R54 AGE-RELATED PHYSICAL DEBILITY: ICD-10-CM

## 2025-04-15 PROCEDURE — 97110 THERAPEUTIC EXERCISES: CPT

## 2025-04-15 NOTE — OP THERAPY DAILY TREATMENT
"  Outpatient Physical Therapy  DAILY TREATMENT     [facility] Outpatient Physical Therapy  [address]  [place] [address]  Phone:  [phone]  Fax:  [phone]    Date: 04/15/2025    Patient: Kymberly Vuong  YOB: 1945  MRN: [id number]     Time Calculation    Start time: 0930  Stop time: 1011 Time Calculation (min): 41 minutes         Chief Complaint: Knee Problem, Back Problem, and Extremity Weakness    Visit #: 7    SUBJECTIVE:  Pt states that she had very minor lbp and tailbone pain following last session, did not last too long. Notes her caregiver got home over the weekend from [place] so she had a few good workouts since her last PT session.     OBJECTIVE:  Current objective measures:           Therapeutic Exercises (CPT 91699):     1. NuStep, L2 x 5 min    2. Shuttle, 2x 10 3c, 2x 10 2c    3. plyobox push, 30 feet., step too pattern, biased to UE push, but was able to get a few alternating steps    4. Gait with 4WW, 120 feet, SBA with 1 PT support due to LOB (tripped on R toe and caused R knee hyperextension)    5. seated clam, pink x 20 ea    6. standing HS curl (elbows on plyobox), 3# 2x10, NT    7. seated abdominal ball press, 5\" x 20, NT    8. LAQ, 3# x 20 ea    9. Seated march, 3# x 20 ea      Therapeutic Exercise Summary: Access Code: [email or web address]  URL: [email or web address]  Date: 03/07/2025  Prepared by:     Exercises  - Supine Bridge  - 3-4 x daily - 5-10 reps - 5 sec hold  - Clamshell  - 3-4 x daily - 5-10 reps  - Sidelying Hip Abduction  - 3-4 x daily - 5-10 reps      Time-based treatments/modalities:    Physical Therapy Timed Treatment Charges  Therapeutic exercise minutes (CPT 00748): 40 minutes    ASSESSMENT:   Response to treatment: Pt tolerated today's treatment session well today with appropriate fatigue at end of session. Pt did require rest breaks due to fatigue during sled push and gait however improved distance by 10 " ft with plyobox push today with slight increase in stride length on a few steps with alternating step to patterning and small step though patterning. Pt with reports of improved confidence with gait with Rolator today compared to last session, continues to demo R knee hyperextension but did not have episode of LOB today. Will continue to benefit from progressions of strengthening in standing as tolerated.     PLAN/RECOMMENDATIONS:   Plan for treatment: therapy treatment to continue next visit.  Planned interventions for next visit: continue with current treatment.

## 2025-04-16 NOTE — OP THERAPY DAILY TREATMENT
"  Outpatient Physical Therapy  DAILY TREATMENT     [facility] Outpatient Physical Therapy  [address]  [place] [address]  Phone:  [phone]  Fax:  [phone]    Date: 04/17/2025    Patient: Kymberly Vuong  YOB: 1945  MRN: [id number]     Time Calculation    Start time: 0918  Stop time: 0957 Time Calculation (min): 39 minutes         Chief Complaint: Difficulty Walking    Visit #: 8    SUBJECTIVE:  My confidence to work on walking at home is improving. I'd still like to be able to walk more. I know we are going to get to that point.     OBJECTIVE:        Therapeutic Exercises (CPT 17997):     1. NuStep, L3 x 5 min    2. Shuttle, NT    3. plyobox push, 15 feet., step too pattern, biased to UE push, but was able to get a few alternating steps    4. Gait with 4WW, 4x40 feet, SBA    5. side steps, 2x5 feet, // bars with B hand support    6. heel raise, x 20, \"    7. TKE, pink x 20 ea, \" mirror feedback to reduce motion in hips and lumbar    8. Standing march, x 10 ea, \"      Therapeutic Exercise Summary: Access Code: [email or web address]  URL: [email or web address]  Date: 03/07/2025  Prepared by:     Exercises  - Supine Bridge  - 3-4 x daily - 5-10 reps - 5 sec hold  - Clamshell  - 3-4 x daily - 5-10 reps  - Sidelying Hip Abduction  - 3-4 x daily - 5-10 reps      Time-based treatments/modalities:    Physical Therapy Timed Treatment Charges  Therapeutic exercise minutes (CPT 86119): 39 minutes    ASSESSMENT:   Response to treatment: See PN    PLAN/RECOMMENDATIONS:   Plan for treatment: therapy treatment to continue next visit.  Planned interventions for next visit: continue with current treatment.         "

## 2025-04-17 ENCOUNTER — PHYSICAL THERAPY (OUTPATIENT)
Dept: PHYSICAL THERAPY | Facility: MEDICAL CENTER | Age: 80
End: 2025-04-17
Attending: FAMILY MEDICINE
Payer: MEDICARE

## 2025-04-17 DIAGNOSIS — M50.30 DDD (DEGENERATIVE DISC DISEASE), CERVICAL: ICD-10-CM

## 2025-04-17 DIAGNOSIS — M17.11 LOCALIZED OSTEOARTHRITIS OF RIGHT KNEE: ICD-10-CM

## 2025-04-17 DIAGNOSIS — R54 AGE-RELATED PHYSICAL DEBILITY: ICD-10-CM

## 2025-04-17 DIAGNOSIS — R29.6 REPEATED FALLS: ICD-10-CM

## 2025-04-17 PROCEDURE — 97110 THERAPEUTIC EXERCISES: CPT

## 2025-04-17 NOTE — OP THERAPY PROGRESS SUMMARY
Outpatient Physical Therapy  PROGRESS SUMMARY NOTE      [facility] Outpatient Physical Therapy  [address]  [place] [address]  Phone:  [phone]  Fax:  [phone]    Date of Visit: 04/17/2025    Patient: Kymberly Vuong  YOB: 1945  MRN: [id number]     Referring Provider: [address]  [place] [address]   Referring Diagnosis Unilateral primary osteoarthritis, right knee [M17.11];Repeated falls [R29.6];Age-related physical debility [R54];Other cervical disc degeneration, unspecified cervical region [M50.30]     Visit Diagnoses     ICD-10-CM   1. Localized osteoarthritis of right knee  M17.11   2. Repeated falls  R29.6   3. Age-related physical debility  R54   4. DDD (degenerative disc disease), cervical  M50.30       Rehab Potential: good    Progress Report Period: 3/7/25-4/17/25    Functional Assessment Used          Objective Findings and Assessment:   Patient progression towards goals:   Kymberly has been seen for 8 PT sessions supporting the management of her general weakness and difficulty walking related to chronic spinal stenosis and multiple joint OA, for which she is not a surgical candidate. Treatment has involved progressive therex for restoration of strength and endurance. Kymberly demonstrates excellent motivation and participation. Initially she was only able to complete tasks with proper form in seated or supine, but recently has been able to transition into standing task with biofeedback and frequent seated rest. Weakness in the midline, hips and quads continues to result in knee hyperextension and trendelenburg patterns, but she is developing self awareness to correct. She continues to be dependent on 2 hand support for standing, but has increased her standing time to 3-4 minutes and walking distance to 150 feet with a 4WW. Considering the functional progress made, but continued room for improvement,  breathing is unlabored without accessory muscle use. "recommending continuation of skilled PT services to maximize functional independence.     Goals:   Short Term Goals:     - Improve R hip abd strength to at least 3/5 (still 2/5)  - Improve bridge to full height x 5 reps (MET)  - Pt is able to utilize seat in 4WW for seated rest and recover to standing independently with good safety. (Not yet attempted)  Short term goal time span:  1-2 weeks      Long Term Goals:      - Improve TUG to 39\" or better with 4WW (Progressing, 42 seconds)  - Pt is able to stand erect with 1 hand of support x 1 min with good balance  - Pt demonstrates independence with a HEP for continued management of this problem beyond discharge from therapy.   Long term goal time span:  6-8 weeks    Plan:   Planned therapy interventions:  Functional Training, Self Care (CPT 09298), Therapeutic Activities (CPT 32466), Therapeutic Exercise (CPT 58322), Neuromuscular Re-education (CPT 74743), Manual Therapy (CPT 76364) and Gait Training (CPT 95189)  Frequency: 1-2x/week.  Duration in weeks:  8      Referring provider co-signature:  I have reviewed this plan of care and my co-signature certifies the need for services.     Certification Period: 04/17/2025 to 06/12/25    Physician Signature: ________________________________ Date: ______________          "

## 2025-04-21 NOTE — OP THERAPY DAILY TREATMENT
Outpatient Physical Therapy  DAILY TREATMENT     [facility] Outpatient Physical Therapy  [address]  [place] [address]  Phone:  [phone]  Fax:  [phone]    Date: 04/22/2025    Patient: Kymberly Vuong  YOB: 1945  MRN: [id number]     Time Calculation    Start time: 1245  Stop time: 1328 Time Calculation (min): 43 minutes         Chief Complaint: Difficulty Walking and Weakness    Visit #: 9    SUBJECTIVE:  I was tired, but ok after last visit. I'm trying to move as much as I can. Starting to feel more comfortable trying to walk in the home.     OBJECTIVE:    TUG= 39 seconds with 4WW    Therapeutic Exercises (CPT 17334):     1. NuStep, L4 x 7 min, = 1 lap    2. Shuttle, Squat: 3C x 20, SL 2C 2x10      Therapeutic Exercise Summary: Access Code: [email or web address]  URL: [email or web address]  Date: 03/07/2025  Prepared by:     Exercises  - Supine Bridge  - 3-4 x daily - 5-10 reps - 5 sec hold  - Clamshell  - 3-4 x daily - 5-10 reps  - Sidelying Hip Abduction  - 3-4 x daily - 5-10 reps    Therapeutic Treatments and Modalities:     1. Gait Training (CPT 03906)    Therapeutic Treatment and Modalities Summary:   Practice ambulating with 4WW, sitting to 4WW and recovering to standing.   5x20 feet completed. First attempted in free space, but required CGA for the pivot turn and to support the 4WW. Following attempts were completed with the 4WW against the wall. Able to complete SBA.     //Bars pivots (360 degrees, 1/4 turn at a time). X 3 ea    Time-based treatments/modalities:    Physical Therapy Timed Treatment Charges  Gait training minutes (CPT 81230): 25 minutes  Therapeutic exercise minutes (CPT 03188): 15 minutes    ASSESSMENT:   Response to treatment: Gait speed and transitions improving as noted by TUG. Beginning to be able to transition to 4WW for recovery, but only if the 4WW is supported next to a wall. STS from the 4WW is challenging, but  doable. Demands increased assist from UEs. Continue building UE/LE strength, balance and gait endurance.     PLAN/RECOMMENDATIONS:   Plan for treatment: therapy treatment to continue next visit.  Planned interventions for next visit: continue with current treatment.

## 2025-04-22 ENCOUNTER — PHYSICAL THERAPY (OUTPATIENT)
Dept: PHYSICAL THERAPY | Facility: MEDICAL CENTER | Age: 80
End: 2025-04-22
Attending: FAMILY MEDICINE
Payer: MEDICARE

## 2025-04-22 DIAGNOSIS — R29.6 REPEATED FALLS: ICD-10-CM

## 2025-04-22 DIAGNOSIS — M50.30 DDD (DEGENERATIVE DISC DISEASE), CERVICAL: ICD-10-CM

## 2025-04-22 DIAGNOSIS — R54 AGE-RELATED PHYSICAL DEBILITY: ICD-10-CM

## 2025-04-22 DIAGNOSIS — M17.11 LOCALIZED OSTEOARTHRITIS OF RIGHT KNEE: ICD-10-CM

## 2025-04-22 PROCEDURE — 97110 THERAPEUTIC EXERCISES: CPT

## 2025-04-22 PROCEDURE — 97116 GAIT TRAINING THERAPY: CPT

## 2025-04-28 NOTE — OP THERAPY DAILY TREATMENT
"  Outpatient Physical Therapy  DAILY TREATMENT     [facility] Outpatient Physical Therapy  [address]  [place] [address]  Phone:  [phone]  Fax:  [phone]    Date: 04/29/2025    Patient: Kymberly Vuong  YOB: 1945  MRN: [id number]     Time Calculation    Start time: 1245  Stop time: 1330 Time Calculation (min): 45 minutes         Chief Complaint: Difficulty Walking    Visit #: 10    SUBJECTIVE:  My R knee is extra poppy today. I think because of the drop in weather over the weekend. \"I'm thawing out now.\" Will be having another R knee CSI next month.     OBJECTIVE:      Therapeutic Exercises (CPT 72541):     1. NuStep, L4 x 10 min, = 0.36    2. Shuttle, Squat: 3C x 20, SL 2C x 20    3. Step up, 4\" x 10 ea, //bars, B UE support    4. Pivot turns, B UE support x 5 ea, 1 UE support x 2 ea, //bars    5. Ambulation with 4WW, 1x150 feet with SBA and recovery to the 4WW      Therapeutic Exercise Summary: Access Code: [email or web address]  URL: [email or web address]  Date: 03/07/2025  Prepared by:     Exercises  - Supine Bridge  - 3-4 x daily - 5-10 reps - 5 sec hold  - Clamshell  - 3-4 x daily - 5-10 reps  - Sidelying Hip Abduction  - 3-4 x daily - 5-10 reps      Time-based treatments/modalities:    Physical Therapy Timed Treatment Charges  Therapeutic exercise minutes (CPT 87624): 45 minutes    ASSESSMENT:   Response to treatment: Continuing to see progress with walking endurance. Improved safety with transfers on and off the 4WW. Poor eccentric quad control on the R, but was able to trial low rep step ups with good success.     PLAN/RECOMMENDATIONS:   Plan for treatment: therapy treatment to continue next visit.  Planned interventions for next visit: continue with current treatment.         "

## 2025-04-29 ENCOUNTER — PHYSICAL THERAPY (OUTPATIENT)
Dept: PHYSICAL THERAPY | Facility: MEDICAL CENTER | Age: 80
End: 2025-04-29
Attending: FAMILY MEDICINE
Payer: MEDICARE

## 2025-04-29 DIAGNOSIS — M50.30 DDD (DEGENERATIVE DISC DISEASE), CERVICAL: ICD-10-CM

## 2025-04-29 DIAGNOSIS — M17.11 LOCALIZED OSTEOARTHRITIS OF RIGHT KNEE: ICD-10-CM

## 2025-04-29 DIAGNOSIS — R29.6 REPEATED FALLS: ICD-10-CM

## 2025-04-29 DIAGNOSIS — R54 AGE-RELATED PHYSICAL DEBILITY: ICD-10-CM

## 2025-04-29 PROCEDURE — 97110 THERAPEUTIC EXERCISES: CPT

## 2025-05-08 ENCOUNTER — PHYSICAL THERAPY (OUTPATIENT)
Dept: PHYSICAL THERAPY | Facility: MEDICAL CENTER | Age: 80
End: 2025-05-08
Attending: FAMILY MEDICINE
Payer: MEDICARE

## 2025-05-08 DIAGNOSIS — R54 AGE-RELATED PHYSICAL DEBILITY: ICD-10-CM

## 2025-05-08 DIAGNOSIS — M17.11 LOCALIZED OSTEOARTHRITIS OF RIGHT KNEE: ICD-10-CM

## 2025-05-08 DIAGNOSIS — R29.6 REPEATED FALLS: ICD-10-CM

## 2025-05-08 DIAGNOSIS — M50.30 DDD (DEGENERATIVE DISC DISEASE), CERVICAL: ICD-10-CM

## 2025-05-08 PROCEDURE — 97110 THERAPEUTIC EXERCISES: CPT

## 2025-05-08 PROCEDURE — 97112 NEUROMUSCULAR REEDUCATION: CPT

## 2025-05-08 NOTE — OP THERAPY DAILY TREATMENT
"  Outpatient Physical Therapy  DAILY TREATMENT     [facility] Outpatient Physical Therapy  [address]  [place] [address]  Phone:  [phone]  Fax:  [phone]    Date: 05/08/2025    Patient: Kymberly Vuong  YOB: 1945  MRN: [id number]     Time Calculation    Start time: 1307  Stop time: 1353 Time Calculation (min): 46 minutes         Chief Complaint: Difficulty Walking and Weakness    Visit #: 11    SUBJECTIVE:  I've had a difficult week with friend/family stress. Also developed a rash on the L knee from her brace, so is putting topical ointment on it and taking a break from the brace for now.     OBJECTIVE:      Therapeutic Exercises (CPT 70939):     1. NuStep, L4 x 5 minutes. 30/30\" on/off for the next 5 min (95 spm/70 spm)    2. Shuttle, Squat: 3C x 20, SL 2C x 20      Therapeutic Exercise Summary: Access Code: [email or web address]  URL: [email or web address]  Date: 03/07/2025  Prepared by:     Exercises  - Supine Bridge  - 3-4 x daily - 5-10 reps - 5 sec hold  - Clamshell  - 3-4 x daily - 5-10 reps  - Sidelying Hip Abduction  - 3-4 x daily - 5-10 reps    Therapeutic Treatments and Modalities:     1. Neuromuscular Re-education (CPT 56710)    Therapeutic Treatment and Modalities Summary:   - Standing balance: standard width. Max effort 39\"  - Summers hold: 10# unilateral x 30\" ea  - Staggered stance balance with glute med focus (foam roller brace at knee) x 15\" ea (more challenging with L LE fwd, req 2 hands of support)  - Ambulation with 4WW and SBA= 128 feet    Time-based treatments/modalities:    Physical Therapy Timed Treatment Charges  Neuromusc re-ed, balance, coor, post minutes (CPT 75311): 15 minutes  Therapeutic exercise minutes (CPT 01688): 30 minutes    ASSESSMENT:   Response to treatment: Despite coming in fatigued and discouraged, Kymberly continues to show high motivation in the clinic and steady progress. Gaining endurance and strength for " standing balance without support and statically with light loads.     PLAN/RECOMMENDATIONS:   Plan for treatment: therapy treatment to continue next visit.  Planned interventions for next visit: continue with current treatment.  Continue to gain strength to enhance walking/standing independence. Eugene target glutes and quads.

## 2025-05-12 NOTE — OP THERAPY DAILY TREATMENT
"  Outpatient Physical Therapy  DAILY TREATMENT     [facility] Outpatient Physical Therapy  [address]  [place] [address]  Phone:  [phone]  Fax:  [phone]    Date: 05/13/2025    Patient: Kymberly Vuong  YOB: 1945  MRN: [id number]     Time Calculation    Start time: 1245  Stop time: 1330 Time Calculation (min): 45 minutes         Chief Complaint: Knee Problem    Visit #: 12    SUBJECTIVE:  Was not able to get her R CSI due to the rash, believed to be heat rash. \"I have to wait until it clears up.\" Will be able to reschedule likely next week. Wearing a soft brace on the R knee today. Overall, I can believe the improvement I've had the last couple months. I feel like my independence is nearer to how I was moving 2 years ago and I am still seeing progress.     OBJECTIVE:      Therapeutic Exercises (CPT 05055):     1. NuStep, L4 x 5 minutes. 30/30\" on/off for the next 5 min (100 spm/70 spm)    2. Shuttle, NT    3. Staggered stance DL, 7.5# from 6\" step with 1 hand support on the rail. x 3 ea    4. side steps, // bars with mirror feedback 2x5 feet      Therapeutic Exercise Summary: Access Code: [email or web address]  URL: [email or web address]  Date: 03/07/2025  Prepared by:     Exercises  - Supine Bridge  - 3-4 x daily - 5-10 reps - 5 sec hold  - Clamshell  - 3-4 x daily - 5-10 reps  - Sidelying Hip Abduction  - 3-4 x daily - 5-10 reps  - Counter side steps 3x5 feet    Therapeutic Treatments and Modalities:     1. Neuromuscular Re-education (CPT 34889)    Therapeutic Treatment and Modalities Summary:   - Standing balance: standard width. Max effort 1:15 seconds  - Unilateral row: x 10 ea in staggered stance with 1 hand of support. Grn. (Hyperextends knees, compensates with lumbar extension, complains of some LBP. Can correct with cuing but endurance is limited)  - Pivot turns x 3 ea with 2 hand support    Time-based treatments/modalities:    Physical " Therapy Timed Treatment Charges  Neuromusc re-ed, balance, coor, post minutes (CPT 85688): 15 minutes  Therapeutic exercise minutes (CPT 99362): 30 minutes    ASSESSMENT:   Response to treatment: Increased difficulty standing from low surfaces today. Requires Zuleyma to initiate the movement. May be related to apprehension in loading the R knee. Otherwise is gradually improving with glute recruitment. Substantial increase in standing time without UE support. Slowly lists fwd as the glutes fatigue. Continues to leverage knee hyperextension patterns for support due to quad weakness as well.     PLAN/RECOMMENDATIONS:   Plan for treatment: therapy treatment to continue next visit.  Planned interventions for next visit: continue with current treatment.

## 2025-05-13 ENCOUNTER — PHYSICAL THERAPY (OUTPATIENT)
Dept: PHYSICAL THERAPY | Facility: MEDICAL CENTER | Age: 80
End: 2025-05-13
Attending: FAMILY MEDICINE
Payer: MEDICARE

## 2025-05-13 DIAGNOSIS — M17.11 LOCALIZED OSTEOARTHRITIS OF RIGHT KNEE: ICD-10-CM

## 2025-05-13 DIAGNOSIS — M50.30 DDD (DEGENERATIVE DISC DISEASE), CERVICAL: ICD-10-CM

## 2025-05-13 DIAGNOSIS — R29.6 REPEATED FALLS: ICD-10-CM

## 2025-05-13 DIAGNOSIS — R54 AGE-RELATED PHYSICAL DEBILITY: ICD-10-CM

## 2025-05-13 PROCEDURE — 97110 THERAPEUTIC EXERCISES: CPT

## 2025-05-13 PROCEDURE — 97112 NEUROMUSCULAR REEDUCATION: CPT

## 2025-05-19 NOTE — OP THERAPY DAILY TREATMENT
Outpatient Physical Therapy  DAILY TREATMENT     [facility] Outpatient Physical Therapy  [address]  [place] [address]  Phone:  [phone]  Fax:  [phone]    Date: 2025    Patient: Kymberly Vuong  YOB: 1945  MRN: [id number]     Time Calculation    Start time: 1240  Stop time: 1333 Time Calculation (min): 53 minutes         Chief Complaint: Weakness    Visit #: 13    SUBJECTIVE:  I am hoping to reschedule my R knee injection for this week.     OBJECTIVE:      Therapeutic Exercises (CPT 04069):     1. NuStep, L5 x 10 minutes      Therapeutic Exercise Summary: Access Code: [email or web address]  URL: [email or web address]  Date: 2025  Prepared by:     Exercises  - Supine Bridge  - 3-4 x daily - 5-10 reps - 5 sec hold  - Clamshell  - 3-4 x daily - 5-10 reps  - Sidelying Hip Abduction  - 3-4 x daily - 5-10 reps  - Counter side steps 3x5 feet    Therapeutic Treatments and Modalities:     1. Neuromuscular Re-education (CPT 49078)    Therapeutic Treatment and Modalities Summary:   TU seconds, 30 seconds, 24 seconds= 29 second average    Standing without support: 1:31 (max effort, begins to lean fwd and R).     3 min walk test: using 4WW and wheelchair follow= 262 feet.     Reviewed outcomes and progress compared to last PN. Collaborated to establish goals of care.     Time-based treatments/modalities:    Physical Therapy Timed Treatment Charges  Neuromusc re-ed, balance, coor, post minutes (CPT 97420): 43 minutes  Therapeutic exercise minutes (CPT 52200): 10 minutes    ASSESSMENT:   Response to treatment: See PN    PLAN/RECOMMENDATIONS:   Plan for treatment: therapy treatment to continue next visit.  Planned interventions for next visit: continue with current treatment.

## 2025-05-20 ENCOUNTER — PHYSICAL THERAPY (OUTPATIENT)
Dept: PHYSICAL THERAPY | Facility: MEDICAL CENTER | Age: 80
End: 2025-05-20
Attending: FAMILY MEDICINE
Payer: MEDICARE

## 2025-05-20 DIAGNOSIS — R29.6 REPEATED FALLS: ICD-10-CM

## 2025-05-20 DIAGNOSIS — M17.11 LOCALIZED OSTEOARTHRITIS OF RIGHT KNEE: Primary | ICD-10-CM

## 2025-05-20 DIAGNOSIS — R54 AGE-RELATED PHYSICAL DEBILITY: ICD-10-CM

## 2025-05-20 DIAGNOSIS — M50.30 DDD (DEGENERATIVE DISC DISEASE), CERVICAL: ICD-10-CM

## 2025-05-20 PROCEDURE — 97112 NEUROMUSCULAR REEDUCATION: CPT

## 2025-05-20 PROCEDURE — 97110 THERAPEUTIC EXERCISES: CPT

## 2025-05-20 NOTE — OP THERAPY PROGRESS SUMMARY
Outpatient Physical Therapy  PROGRESS SUMMARY NOTE      [facility] Outpatient Physical Therapy  [address]  [place] [address]  Phone:  [phone]  Fax:  [phone]    Date of Visit: 05/20/2025    Patient: Kymberly Vuong  YOB: 1945  MRN: [id number]     Referring Provider: CHAVA [address]  [place] [address]   Referring Diagnosis Unilateral primary osteoarthritis, right knee [M17.11];Repeated falls [R29.6];Age-related physical debility [R54];Other cervical disc degeneration, unspecified cervical region [M50.30]     Visit Diagnoses     ICD-10-CM   1. Localized osteoarthritis of right knee  M17.11   2. Repeated falls  R29.6   3. Age-related physical debility  R54   4. DDD (degenerative disc disease), cervical  M50.30       Rehab Potential: good    Progress Report Period: 4/17/25-5/20/25    Functional Assessment Used          Objective Findings and Assessment:   Patient progression towards goals:   Kymberly has been seen for 8 PT sessions supporting the management of her general weakness and difficulty walking related to chronic spinal stenosis and multiple joint OA, for which she is not a surgical candidate. Treatment has involved progressive therex for restoration of strength and endurance. Kymberly continues to demonstrate excellent motivation and participation, which is reflected in her further progress from the last progress note. Kymberly still requires heavy use of UEs to stand from a seated position, but she has made substantial progress in her standing endurance, gait speeds and gait endurance (noted below). Given the improvements, has the components required to be a household ambulator, but still lacks confidence to walk when her caregiver is not present. Skilled PT services are recommended to continue with focus on building functional strength to enhance transitions, stability in standing, endurance with walking and balance  "confidence. All prior goals have been met and now updated.     Objective findings and assessment details:   TU seconds, 30 seconds, 24 seconds= 29 second average     Standing without support: 1:31 (max effort, begins to lean fwd and R).      3 min walk test: using 4WW and wheelchair follow= 262 feet.       Goals:   Short Term Goals:     - Improve R hip abd strength to at least 3/5 (MET)  - Improve bridge to full height x 5 reps (MET)  - Pt is able to utilize seat in 4WW for seated rest and recover to standing independently with good safety. (Able to if 4WW is braced against the wall, but too unstable in free space. Best with SBA)    Short term goal time span:  1-2 weeks      Long Term Goals:      - Improve TUG to 39\" or better with 4WW (MET= 29 seconds. New goal established= 20 seconds)  - Pt is able to stand erect with 1 hand of support x 1 min with good balance (MET. New goal established= 2 minutes)  - Pt is able to participate in a 6 minute walk test using her 4WW and completing at least 350 feet.   - Pt demonstrates independence with a HEP for continued management of this problem beyond discharge from therapy. (Needs progression)  Long term goal time span:  6-8 weeks    Plan:   Planned therapy interventions:  Functional Training, Self Care (CPT 90759), Therapeutic Activities (CPT 95167), Therapeutic Exercise (CPT 10817), Gait Training (CPT 79704), Neuromuscular Re-education (CPT 89889) and Manual Therapy (CPT 80608)  Frequency:  1x week  Duration in weeks:  8      Referring provider co-signature:  I have reviewed this plan of care and my co-signature certifies the need for services.     Certification Period: 2025 to 07/15/25    Physician Signature: ________________________________ Date: ______________          "

## 2025-05-23 NOTE — OP THERAPY DAILY TREATMENT
"  Outpatient Physical Therapy  DAILY TREATMENT     [facility] Outpatient Physical Therapy  [address]  [place] [address]  Phone:  [phone]  Fax:  [phone]    Date: 05/27/2025    Patient: Kymberly Vuong  YOB: 1945  MRN: [id number]     Time Calculation    Start time: 1245  Stop time: 1331 Time Calculation (min): 46 minutes         Chief Complaint: Weakness    Visit #: 14    SUBJECTIVE:  Received her R knee CSI on 5/22. \"That's doing pretty well.\" Reports she was fatigued after last visit, \"but I did ok.\"     OBJECTIVE:      Therapeutic Exercises (CPT 78426):     1. NuStep, L5 x 5 minutes    2. PlyoBox push, 3x15 feet    3. TKE, purple x 15 ea, Eccentric phase more challenging on the R, but shows good recall of cues.    4. Heel raises, x 20, B UE support on 4WW      Therapeutic Exercise Summary: Access Code: [email or web address]  URL: [email or web address]  Date: 03/07/2025  Prepared by:     Exercises  - Supine Bridge  - 3-4 x daily - 5-10 reps - 5 sec hold  - Clamshell  - 3-4 x daily - 5-10 reps  - Sidelying Hip Abduction  - 3-4 x daily - 5-10 reps  - Counter side steps 3x5 feet    Therapeutic Treatments and Modalities:     1. Gait Training (CPT 73772)    Therapeutic Treatment and Modalities Summary:   STS: from 20\" surface with attempts to complete with only 1 hand to push off from seat. PT in front of pt for Zuleyma of the R knee. 10 reps completed.     Gait with 4WW 3x60 feet + 1x 200 feet. (Completed between therex tasks)      Time-based treatments/modalities:    Physical Therapy Timed Treatment Charges  Gait training minutes (CPT 65219): 15 minutes  Therapeutic exercise minutes (CPT 37016): 30 minutes    ASSESSMENT:   Response to treatment: Hip strength is gradually improving. Function currently most limited by weakness in the R knee. Is able to eccentrically control R knee flexion if she is closely guarded, but still requires heavy use of hands " for strength and safety.     PLAN/RECOMMENDATIONS:   Plan for treatment: therapy treatment to continue next visit.  Planned interventions for next visit: continue with current treatment.

## 2025-05-27 ENCOUNTER — PHYSICAL THERAPY (OUTPATIENT)
Dept: PHYSICAL THERAPY | Facility: MEDICAL CENTER | Age: 80
End: 2025-05-27
Attending: FAMILY MEDICINE
Payer: MEDICARE

## 2025-05-27 DIAGNOSIS — R29.6 REPEATED FALLS: ICD-10-CM

## 2025-05-27 DIAGNOSIS — M50.30 DDD (DEGENERATIVE DISC DISEASE), CERVICAL: ICD-10-CM

## 2025-05-27 DIAGNOSIS — R54 AGE-RELATED PHYSICAL DEBILITY: ICD-10-CM

## 2025-05-27 DIAGNOSIS — M17.11 LOCALIZED OSTEOARTHRITIS OF RIGHT KNEE: Primary | ICD-10-CM

## 2025-05-27 PROCEDURE — 97116 GAIT TRAINING THERAPY: CPT

## 2025-05-27 PROCEDURE — 97110 THERAPEUTIC EXERCISES: CPT

## 2025-06-04 ENCOUNTER — APPOINTMENT (OUTPATIENT)
Dept: PHYSICAL THERAPY | Facility: MEDICAL CENTER | Age: 80
End: 2025-06-04
Attending: FAMILY MEDICINE
Payer: MEDICARE

## 2025-06-04 DIAGNOSIS — R54 AGE-RELATED PHYSICAL DEBILITY: ICD-10-CM

## 2025-06-04 DIAGNOSIS — R29.6 REPEATED FALLS: ICD-10-CM

## 2025-06-04 DIAGNOSIS — M50.30 DDD (DEGENERATIVE DISC DISEASE), CERVICAL: ICD-10-CM

## 2025-06-04 DIAGNOSIS — M17.11 LOCALIZED OSTEOARTHRITIS OF RIGHT KNEE: Primary | ICD-10-CM

## 2025-06-04 NOTE — OP THERAPY DAILY TREATMENT
"  Outpatient Physical Therapy  DAILY TREATMENT     [facility] Outpatient Physical Therapy  [address]  [place] [address]  Phone:  [phone]  Fax:  [phone]    Date: 06/04/2025    Patient: Kymberly Vuong  YOB: 1945  MRN: [id number]     Time Calculation                   Chief Complaint: No chief complaint on file.    Visit #: 15    SUBJECTIVE:  ***    OBJECTIVE:      Therapeutic Exercises (CPT 60011):     1. NuStep, L5 x 5 minutes    2. PlyoBox push, 3x15 feet    3. TKE, purple x 15 ea, Eccentric phase more challenging on the R, but shows good recall of cues.    4. Heel raises, x 20, B UE support on 4WW      Therapeutic Exercise Summary: Access Code: [email or web address]  URL: [email or web address]  Date: 03/07/2025  Prepared by:     Exercises  - Supine Bridge  - 3-4 x daily - 5-10 reps - 5 sec hold  - Clamshell  - 3-4 x daily - 5-10 reps  - Sidelying Hip Abduction  - 3-4 x daily - 5-10 reps  - Counter side steps 3x5 feet    Therapeutic Treatments and Modalities:     1. Gait Training (CPT 76244)    Therapeutic Treatment and Modalities Summary:   STS: from 20\" surface with attempts to complete with only 1 hand to push off from seat. PT in front of pt for Zuleyma of the R knee. 10 reps completed.     Gait with 4WW 3x60 feet + 1x 200 feet. (Completed between therex tasks)      Time-based treatments/modalities:         ASSESSMENT:   Response to treatment: ***    PLAN/RECOMMENDATIONS:   Plan for treatment: therapy treatment to continue next visit.  Planned interventions for next visit: continue with current treatment.         "

## 2025-06-11 NOTE — OP THERAPY DAILY TREATMENT
"  Outpatient Physical Therapy  DAILY TREATMENT     [facility] Outpatient Physical Therapy  [address]  [place] [address]  Phone:  [phone]  Fax:  [phone]    Date: 06/12/2025    Patient: Kymberly Vuong  YOB: 1945  MRN: [id number]     Time Calculation    Start time: 1045  Stop time: 1128 Time Calculation (min): 43 minutes         Chief Complaint: Difficulty Walking    Visit #: 15    SUBJECTIVE:  Missed last visit due to GI upset. Feeling better now. Has been walking more at home. Gaining confidence to be able to walk without anyone home.     OBJECTIVE:      Therapeutic Exercises (CPT 82453):     1. NuStep, L5 x 8 minutes    2. // bars    3. - lateral weight shift focusing on glute recruitment, x 2 min, palpation for feedback    4. - toy soldiers, no resistance, B UE support x 2 min    5. - Staggered stance/mini lunge hold, x 30\" ea, With R LE fwd collapses into hip IR, with R LE back the knee catalina    6. - Staggered stance TKE, blue x 10, max cues, challenging    7. - Staggered stance clam with front leg, R, blue x 10 ea, max cues, challenging    8. - Farmer hold, unilateral, 10#. max effort L= 30\", R= 40\"    9. - Unilateral row in staggered stance, blue x 10 ea, 1 hand support. With R fwd, was able to remove hand support for a couple reps.      Therapeutic Exercise Summary: Access Code: [email or web address]  URL: [email or web address]  Date: 03/07/2025  Prepared by:     Exercises  - Supine Bridge  - 3-4 x daily - 5-10 reps - 5 sec hold  - Clamshell  - 3-4 x daily - 5-10 reps  - Sidelying Hip Abduction  - 3-4 x daily - 5-10 reps  - Counter side steps 3x5 feet    Therapeutic Treatments and Modalities:     1. Gait Training (CPT 85996)    Therapeutic Treatment and Modalities Summary:   Pregait stepping in //bars. Focusing on glute med recruitment on the R> weight shifts x 3 min   Gait with 4WW 1x220 feet      Time-based " treatments/modalities:    Physical Therapy Timed Treatment Charges  Gait training minutes (CPT 44975): 13 minutes  Therapeutic exercise minutes (CPT 03959): 30 minutes    ASSESSMENT:   Response to treatment: Continued progress despite the lapse in care. Doing well with foundtational strength HEP. Requires skilled guidance to access LE stabilizers in standing and walking. At risk for falls due to knee buckling if the R LE is fatigued, but Kymberly generally has a good self awareness of appropriate rest intervals.     PLAN/RECOMMENDATIONS:   Plan for treatment: therapy treatment to continue next visit.  Planned interventions for next visit: continue with current treatment.

## 2025-06-12 ENCOUNTER — PHYSICAL THERAPY (OUTPATIENT)
Dept: PHYSICAL THERAPY | Facility: MEDICAL CENTER | Age: 80
End: 2025-06-12
Attending: FAMILY MEDICINE
Payer: MEDICARE

## 2025-06-12 DIAGNOSIS — M50.30 DDD (DEGENERATIVE DISC DISEASE), CERVICAL: ICD-10-CM

## 2025-06-12 DIAGNOSIS — R54 AGE-RELATED PHYSICAL DEBILITY: ICD-10-CM

## 2025-06-12 DIAGNOSIS — M17.11 LOCALIZED OSTEOARTHRITIS OF RIGHT KNEE: Primary | ICD-10-CM

## 2025-06-12 DIAGNOSIS — R29.6 REPEATED FALLS: ICD-10-CM

## 2025-06-12 PROCEDURE — 97116 GAIT TRAINING THERAPY: CPT

## 2025-06-12 PROCEDURE — 97110 THERAPEUTIC EXERCISES: CPT

## 2025-06-18 NOTE — OP THERAPY DAILY TREATMENT
"  Outpatient Physical Therapy  DAILY TREATMENT     [facility] Outpatient Physical Therapy  [address]  [place] [address]  Phone:  [phone]  Fax:  [phone]    Date: 06/20/2025    Patient: Kymberly Vuong  YOB: 1945  MRN: [id number]     Time Calculation    Start time: 1100  Stop time: 1142 Time Calculation (min): 42 minutes         Chief Complaint: Difficulty Walking    Visit #: 16    SUBJECTIVE:  I'm exhausted from all the activities that have been planned for my 80th birthday. I do have an infection from a cracked heel on the inner R side. Currently taking abx. Does impact tolerance for standing. Continues to see her thighs and hips improving in strength.      OBJECTIVE:        Therapeutic Exercises (CPT 30840):     1. NuStep, L5 x 5 minutes, Is able to maintain neural R LE alignment throughout    2. Shuttle, Squat: 4C x 20, SL 2C x 20, R knee has quite a bit of \"squeeking\" crepitus to start, but improves as she progresses.      Therapeutic Exercise Summary: Access Code: [email or web address]  URL: [email or web address]  Date: 03/07/2025  Prepared by:     Exercises  - Supine Bridge  - 3-4 x daily - 5-10 reps - 5 sec hold  - Clamshell  - 3-4 x daily - 5-10 reps  - Sidelying Hip Abduction  - 3-4 x daily - 5-10 reps  - Counter side steps 3x5 feet    Therapeutic Treatments and Modalities:     1. Neuromuscular Re-education (CPT 36479)    Therapeutic Treatment and Modalities Summary:   - Staggered stance balance with foam roll support at knee to promote hip stability: 4 position, attempts x 1 min ea. Able to gradually remove UE support in all positions except for R knee back due to knee buckling.     - Blaze pods: color catch R only then L only x30\" ea. To increase endurance for modified SLS. Does require B UE support.     - Gait: 4WW 1x140 feet with SBA    Time-based treatments/modalities:    Physical Therapy Timed Treatment Charges  Neuromusc re-ed, " balance, coor, post minutes (CPT 43727): 27 minutes  Therapeutic exercise minutes (CPT 68540): 15 minutes    ASSESSMENT:   Response to treatment: Some limitation in walking capacity due to heel pain today, through continues to show improvement in recruitment of quads and glutes in WBing. Due for reassessment of progress next visit to determine ongoing needs.     PLAN/RECOMMENDATIONS:   Plan for treatment: therapy treatment to continue next visit.  Planned interventions for next visit: continue with current treatment.

## 2025-06-20 ENCOUNTER — PHYSICAL THERAPY (OUTPATIENT)
Dept: PHYSICAL THERAPY | Facility: MEDICAL CENTER | Age: 80
End: 2025-06-20
Attending: FAMILY MEDICINE
Payer: MEDICARE

## 2025-06-20 DIAGNOSIS — R29.6 REPEATED FALLS: ICD-10-CM

## 2025-06-20 DIAGNOSIS — M50.30 DDD (DEGENERATIVE DISC DISEASE), CERVICAL: ICD-10-CM

## 2025-06-20 DIAGNOSIS — R54 AGE-RELATED PHYSICAL DEBILITY: ICD-10-CM

## 2025-06-20 DIAGNOSIS — M17.11 LOCALIZED OSTEOARTHRITIS OF RIGHT KNEE: Primary | ICD-10-CM

## 2025-06-20 PROCEDURE — 97112 NEUROMUSCULAR REEDUCATION: CPT

## 2025-06-20 PROCEDURE — 97110 THERAPEUTIC EXERCISES: CPT

## 2025-06-23 NOTE — OP THERAPY DAILY TREATMENT
Outpatient Physical Therapy  DAILY TREATMENT     [facility] Outpatient Physical Therapy  [address]  [place] [address]  Phone:  [phone]  Fax:  [phone]    Date: 2025    Patient: Kymberly Vuong  YOB: 1945  MRN: [id number]     Time Calculation    Start time: 1015  Stop time: 1100 Time Calculation (min): 45 minutes         Chief Complaint: Weakness    Visit #: 17    SUBJECTIVE:  Fatigued from recent Abx (for the R heelP and Birthday events.     OBJECTIVE:  TU seconds, 24 seconds, 23 seconds=24.3 seconds     Standing without support: 2:26 seconds (max effort, begins to lean fwd and R).      6 min walk test: using 4WW and wheelchair follow= 525 feet, had to sit with 40 seconds remaining.         Therapeutic Exercises (CPT 22884):     1. NuStep, L5 x 5 min    Therapeutic Treatments and Modalities:     1. Therapeutic Activities (CPT 20650)    Therapeutic Treatment and Modalities Summary:   Retesting as noted above. Reviewed results with pt. Collaborated to establish new goals.     Time-based treatments/modalities:    Physical Therapy Timed Treatment Charges  Therapeutic activity minutes (CPT 39993): 40 minutes  Therapeutic exercise minutes (CPT 78182): 5 minutes    ASSESSMENT:   Response to treatment: See PN    PLAN/RECOMMENDATIONS:   Plan for treatment: therapy treatment to continue next visit.  Planned interventions for next visit: continue with current treatment.

## 2025-06-24 ENCOUNTER — PHYSICAL THERAPY (OUTPATIENT)
Dept: PHYSICAL THERAPY | Facility: MEDICAL CENTER | Age: 80
End: 2025-06-24
Attending: FAMILY MEDICINE
Payer: MEDICARE

## 2025-06-24 DIAGNOSIS — M17.11 LOCALIZED OSTEOARTHRITIS OF RIGHT KNEE: Primary | ICD-10-CM

## 2025-06-24 DIAGNOSIS — M50.30 DDD (DEGENERATIVE DISC DISEASE), CERVICAL: ICD-10-CM

## 2025-06-24 DIAGNOSIS — R29.6 REPEATED FALLS: ICD-10-CM

## 2025-06-24 DIAGNOSIS — R54 AGE-RELATED PHYSICAL DEBILITY: ICD-10-CM

## 2025-06-24 PROCEDURE — 97530 THERAPEUTIC ACTIVITIES: CPT

## 2025-06-24 NOTE — OP THERAPY PROGRESS SUMMARY
"  Outpatient Physical Therapy  PROGRESS SUMMARY NOTE      [facility]  [address]  [place] [address]  Phone:  [phone]  Fax:  [phone]    Date of Visit: 06/24/2025    Patient: Kymberly Vuong  YOB: 1945  MRN: [id number]     Referring Provider: CHAVA [address]  [address]  [place] [address]   Referring Diagnosis Unilateral primary osteoarthritis, right knee [M17.11];Repeated falls [R29.6];Age-related physical debility [R54];Other cervical disc degeneration, unspecified cervical region [M50.30]     Visit Diagnoses     ICD-10-CM   1. Localized osteoarthritis of right knee  M17.11   2. DDD (degenerative disc disease), cervical  M50.30   3. Age-related physical debility  R54   4. Repeated falls  R29.6       Rehab Potential: good    Progress Report Period: 5/20/25-6/24/25    Functional Assessment Used          Objective Findings and Assessment:   Patient progression towards goals:   Kymberly has been seen for 17 PT sessions supporting the management of her general weakness and difficulty walking related to chronic spinal stenosis and multiple joint OA, for which she is not a surgical candidate. Treatment has involved progressive therex for restoration of strength and endurance. Kymberly continues to demonstrate excellent motivation and participation, which is reflected in her further progress from the last progress note.     Kymberly is now able to stand from sitting with 1 hand of support at times, but often does require both UEs unless seated on a surface 20\" or higher. She improved by an addition 5 seconds on her TUG, 1 minute in her standing endurance and an impressive 263 feet gain on her 6 min walk test with the 4WW. She has begun walking the home with her 4WW, even when a caregiver is not present, though only about 5-10% of the time and typically defers to the manual wheelchair. Given the improvements but continued " functional limitations, skilled PT services are recommended to continue with focus on building functional strength to enhance transitions, stability in standing, endurance with walking and balance confidence. All prior goals have been met and now updated.     Objective findings and assessment details:   TU seconds, 24 seconds, 23 seconds=24.3 seconds     Standing without support: 2:26 seconds (max effort, begins to lean fwd and R).      6 min walk test: using 4WW and wheelchair follow= 525 feet, had to sit with 40 seconds remaining.     Goals:   Short Term Goals:     - Pt uses her 4WW for at least 15% of household mobility  - Pt is able to   Short term goal time span:  1-2 weeks      Long Term Goals:      - Reports her balance confidence at 70% or better on the ABC  - Pt uses her 4WW for at least 25% of household mobility  - Improve 6MWT to be able to continue the full time using a 4WW and at least 650 feet or greater  - Pt demonstrates independence with a HEP for continued management of this problem beyond discharge from therapy.       Long term goal time span:  6-8 weeks    Plan:   Planned therapy interventions:  Functional Training, Self Care (CPT 17900), Therapeutic Activities (CPT 30765), Therapeutic Exercise (CPT 52260), Neuromuscular Re-education (CPT 73588) and Gait Training (CPT 55322)  Frequency:  1x week  Duration in weeks:  8      Referring provider co-signature:  I have reviewed this plan of care and my co-signature certifies the need for services.     Certification Period: 2025 to 25    Physician Signature: ________________________________ Date: ______________

## 2025-07-01 ENCOUNTER — OFFICE VISIT (OUTPATIENT)
Dept: URGENT CARE | Facility: CLINIC | Age: 80
End: 2025-07-01
Payer: MEDICARE

## 2025-07-01 VITALS
OXYGEN SATURATION: 99 % | TEMPERATURE: 98.8 F | SYSTOLIC BLOOD PRESSURE: 148 MMHG | RESPIRATION RATE: 19 BRPM | BODY MASS INDEX: 21.97 KG/M2 | HEART RATE: 96 BPM | WEIGHT: 140 LBS | HEIGHT: 67 IN | DIASTOLIC BLOOD PRESSURE: 86 MMHG

## 2025-07-01 DIAGNOSIS — E10.621 DIABETIC ULCER OF RIGHT HEEL ASSOCIATED WITH TYPE 1 DIABETES MELLITUS, UNSPECIFIED ULCER STAGE (HCC): ICD-10-CM

## 2025-07-01 DIAGNOSIS — L97.419 DIABETIC ULCER OF RIGHT HEEL ASSOCIATED WITH TYPE 1 DIABETES MELLITUS, UNSPECIFIED ULCER STAGE (HCC): ICD-10-CM

## 2025-07-01 DIAGNOSIS — E10.65 UNCONTROLLED TYPE 1 DIABETES MELLITUS WITH HYPERGLYCEMIA (HCC): Primary | ICD-10-CM

## 2025-07-01 DIAGNOSIS — R73.9 HYPERGLYCEMIA: ICD-10-CM

## 2025-07-01 LAB — GLUCOSE BLD-MCNC: 165 MG/DL (ref 65–99)

## 2025-07-01 PROCEDURE — 3077F SYST BP >= 140 MM HG: CPT | Performed by: NURSE PRACTITIONER

## 2025-07-01 PROCEDURE — 3079F DIAST BP 80-89 MM HG: CPT | Performed by: NURSE PRACTITIONER

## 2025-07-01 PROCEDURE — 99204 OFFICE O/P NEW MOD 45 MIN: CPT | Performed by: NURSE PRACTITIONER

## 2025-07-01 PROCEDURE — 82962 GLUCOSE BLOOD TEST: CPT | Performed by: NURSE PRACTITIONER

## 2025-07-01 NOTE — OP THERAPY DAILY TREATMENT
Outpatient Physical Therapy  DAILY TREATMENT     [facility] Outpatient Physical Therapy  [address]  [place] [address]  Phone:  [phone]  Fax:  [phone]    Date: 07/02/2025    Patient: Kymberly Vuong  YOB: 1945  MRN: [id number]     Time Calculation    Start time: 1133  Stop time: 1215 Time Calculation (min): 42 minutes         Chief Complaint: Difficulty Walking    Visit #: 18    SUBJECTIVE:  My wound on my R heel is not improving. I was put on a more advanced antibiotic and referred to wound care. I am limited in how much I can stand, but I was encouraged to keep moving.     OBJECTIVE:        Therapeutic Exercises (CPT 93572):     1. NuStep, L5 x 10 min    2. LAQ, 4# x 20 ea    3. HS curl, blue x 20 ea    4. Seated hip IR, ball squeeze and 4# bilat x 20, challenging, limited NM control    5. Seated march, 4# x 20 alternating, more challenging on the R    6. Seated trunk rotation, blue x 20 ea    7. Seated unilateral row, blue x 20 ea      Time-based treatments/modalities:    Physical Therapy Timed Treatment Charges  Therapeutic exercise minutes (CPT 74633): 42 minutes    ASSESSMENT:   Response to treatment: Progressing toward newly stated goals will be challenging due to wound care needs and reduced tolerance for WBing. Recommending a pause in PT services while she addresses the wound. Anticipate she will maintain function/strength well as she is quite independent with her HEP. Return in 1 month or sooner for re-eval to resume work toward goals.     PLAN/RECOMMENDATIONS:   Plan for treatment: therapy treatment to continue next visit.  Planned interventions for next visit: continue with current treatment.

## 2025-07-01 NOTE — PROGRESS NOTES
"Kymberly Vuong is a 80 y.o. female who presents for Foot Problem (R foot infection )    Here today with a caretaker.   HPI  This is a new problem. Kymberly Vuong is a 80 y.o. patient who presents to urgent care with c/o: She had a crack in her heel. Went to have pedicure. After pedicure the crack became a ulcer wound that is not heeling. Wound has been present for 3+ weeks. She has been putting a salve on it that is meant for cracked heel treatment. She covers the wound with hypoallergenic bandaids. Increasing redness and swelling in the past 2 days.     Reports good blood sugar control \" usually 180 to 200\"  .   Denies renal problems.   Lives with her family in pvt home.     ROS See HPI    Allergies:     Allergies[1]    PMSFS Hx:  Past Medical History[2]  Past Surgical History[3]  Family History   Problem Relation Age of Onset    Cancer Father     Cancer Sister      Social History     Tobacco Use    Smoking status: Never    Smokeless tobacco: Never   Substance Use Topics    Alcohol use: Yes     Comment: wine socially         Problems:   Problem List[4]    Medications:   Medications Ordered Prior to Encounter[5]     Objective:     BP (!) 148/86   Pulse 96   Temp 37.1 °C (98.8 °F) (Temporal)   Resp 19   Ht 1.702 m (5' 7\")   Wt 63.5 kg (140 lb)   SpO2 99%   BMI 21.93 kg/m²     Physical Exam  Vitals and nursing note reviewed.   Constitutional:       Appearance: Normal appearance.   Cardiovascular:      Rate and Rhythm: Normal rate.      Pulses: Normal pulses.   Pulmonary:      Effort: Pulmonary effort is normal.   Musculoskeletal:        Feet:       Comments: Pt is in wheelchair    Skin:     General: Skin is warm.      Capillary Refill: Capillary refill takes less than 2 seconds.   Neurological:      Mental Status: She is alert.           Component  Ref Range & Units (hover) 3 yr ago  (3/9/22) 3 yr ago  (7/7/21) 4 yr ago  (2/16/21) 4 yr ago  (12/2/20) 4 yr ago  (11/30/20) 4 yr ago  (7/10/20) 6 yr " ago  (3/15/19)   Glycohemoglobin 7.4 Abnormal  7.3 Abnormal  7.6 High  R, CM 8.7 High  R, CM 8.2 Abnormal  CM 7.7 Abnormal  CM 8.4 R   Internal Control Negative Negative Negative     Negative   Internal Control Positive Positiv         Status: Final result       Next appt: 07/02/2025 at 11:30 AM in Physical Therapy (Tamie Donald, PT, DPT)    Test Result Released: Yes (not seen)       Messages: Not Seen    0 Result Notes       1 Patient Communication       1  Topic            Component  Ref Range & Units (hover) 3 yr ago  (3/30/22) 4 yr ago  (2/16/21) 4 yr ago  (12/2/20) 7 yr ago  (4/4/18) 8 yr ago  (6/12/17) 8 yr ago  (7/26/16) 10 yr ago  (2/24/15)   Glucose 164 High  158 High  188 High  140 High  115 High  123 High  196 High    Bun 17 16 17 18 15 15 18   Creatinine 0.60 0.66 0.62 0.63 0.55 Low  0.67 0.65   eGFR 93         Bun-Creatinine Ratio 28 24 27 29 High  27 22 R 28 High  R   Sodium 139 141 137 138 142 141 140   Potassium 4.8 4.5 4.6 4.6 4.5 4.5 4.6   Chloride 100 102 100 97 101 100 R 100 R   Co2 24 24 23 24 R 26 R 25 R 23 R   Calcium 9.2 9.0 9.4 9.0 9.0 8.7 9.5   Total Protein 6.5 6.7 6.3  6.6 6.5 6.5   Albumin 4.0 4.0 3.8  3.9 R 3.8 R 4.2 R   Globulin 2.5 2.7 2.5  2.7 2.7 2.3   A-G Ratio 1.6 1.5 1.5                Results for orders placed or performed in visit on 07/01/25   POCT Glucose    Collection Time: 07/01/25 11:53 AM   Result Value Ref Range    Glucose - Accu-Ck 165 (A) 65 - 99 mg/dL       Assessment /Associated Orders:      1. Uncontrolled type 1 diabetes mellitus with hyperglycemia (HCC)  POCT Glucose    amoxicillin-clavulanate (AUGMENTIN) 875-125 MG Tab    Referral to Wound Clinic      2. Diabetic ulcer of right heel associated with type 1 diabetes mellitus, unspecified ulcer stage (HCC)  amoxicillin-clavulanate (AUGMENTIN) 875-125 MG Tab    Referral to Wound Clinic      3. Hyperglycemia              Medical Decision Making:    Kymberly Vuong is a very pleasant 80 y.o. female who is  clinically stable at today's acute urgent care visit. Presents with acute problem/ concern today.    No acute distress is noted at the time of the visit.  VSS. Appropriate for outpatient care at this time.     Educated in proper administration of  prescription medication(s) ordered today including safety, possible SE, risks, benefits, rationale and alternatives to therapy.   Take with food   Educated in on going wound care at home. The patient is alerted to watch for any signs of infection (redness, pus, pain, increased swelling or fever) and call if such occurs.   Stop using cracked heel cream on wound  Wash daily with mild soap and water  Okay to use OTC polysporin ointment prn   Allow some open air time to wound to promote healing.   Resume all prior  RX medications. Take as prescribed.         Through shared decision making a discussion of the Dx and DDx, management options (risks,benefits, and alternatives to planned treatment), natural progression, supportive care and indications for immediate follow-up discussed. Expressed understanding and the treatment plan was agreed upon.    Questions were encouraged and answered     Follow Up:   Referral placed to wound care clinic. Schedule appt.   Schedule FU with PCP   Return to urgent care prn if new or worsening sx or if there is no improvement in condition prn.    Educated in Red flags and indications to immediately call 911 or present to the Emergency Department.     Billing note: chronic illness with exacerbation/progression; prescription drug management.  Established patient. [id number].         Please note that this dictation was created using voice recognition software. I have worked with consultants from the vendor as well as technical experts from [facility] to optimize the interface. I have made every reasonable attempt to correct obvious errors, but I expect that there are errors of grammar and possibly content that I did not discover before finalizing the  note.  This note was electronically signed by provider           [1] No Known Allergies  [2]   Past Medical History:  Diagnosis Date    Diabetes     Hyperlipidemia     Hypertension    [3]   Past Surgical History:  Procedure Laterality Date    CATARACT PHACO WITH IOL  3/10/2009    [facility]    CATARACT PHACO WITH IOL  2/24/2009    Performed by VALE HAND at SURGERY SAME DAY [facility] ORS   [4]   Patient Active Problem List  Diagnosis    Uncontrolled type 1 diabetes mellitus with hyperglycemia (HCC)    Essential hypertension    Dyslipidemia    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 90 Tablet 2    Insulin Lispro-aabc, 1 U Dial, (LYUMJEV KWIKPEN) 100 UNIT/ML Solution Pen-injector Inject 4-10 Units under the skin 3 times a day before meals. 45 mL 3    Insulin Degludec (TRESIBA FLEXTOUCH) 100 UNIT/ML Solution Pen-injector Inject 22 Units under the skin at bedtime. 45 mL 3    prednisoLONE acetate (PRED FORTE) 1 % Suspension prednisolone acetate 1 % eye drops,suspension      Insulin Pen Needle (PEN NEEDLES) 31G X 6 MM Misc Using 4 pen needles per day with insulin injection 300 Each 3    Nutritional Supplements (VITAMIN D MAINTENANCE PO) Take  by mouth.      losartan (COZAAR) 50 MG Tab Take 50 mg by mouth every day.      therapeutic multivitamin-minerals (THERAGRAN-M) TABS Take 1 Tab by mouth every day.      Misc Natural Products (GLUCOSAMINE CHOND COMPLEX/MSM PO) Take 1 Tab by mouth every day at 6 PM.      docosahexanoic acid (OMEGA 3 FA) 1000 MG CAPS Take 1,000 mg by mouth every day at 6 PM.       No current facility-administered medications on file prior to visit.

## 2025-07-02 ENCOUNTER — PHYSICAL THERAPY (OUTPATIENT)
Dept: PHYSICAL THERAPY | Facility: MEDICAL CENTER | Age: 80
End: 2025-07-02
Attending: FAMILY MEDICINE
Payer: MEDICARE

## 2025-07-02 DIAGNOSIS — R29.6 REPEATED FALLS: ICD-10-CM

## 2025-07-02 DIAGNOSIS — R54 AGE-RELATED PHYSICAL DEBILITY: ICD-10-CM

## 2025-07-02 DIAGNOSIS — M17.11 LOCALIZED OSTEOARTHRITIS OF RIGHT KNEE: Primary | ICD-10-CM

## 2025-07-02 DIAGNOSIS — M50.30 DDD (DEGENERATIVE DISC DISEASE), CERVICAL: ICD-10-CM

## 2025-07-02 PROCEDURE — 97110 THERAPEUTIC EXERCISES: CPT

## 2025-07-09 ENCOUNTER — APPOINTMENT (OUTPATIENT)
Dept: PHYSICAL THERAPY | Facility: MEDICAL CENTER | Age: 80
End: 2025-07-09
Attending: FAMILY MEDICINE
Payer: MEDICARE

## 2025-07-15 ENCOUNTER — APPOINTMENT (OUTPATIENT)
Dept: PHYSICAL THERAPY | Facility: MEDICAL CENTER | Age: 80
End: 2025-07-15
Attending: FAMILY MEDICINE
Payer: MEDICARE

## 2025-07-17 ENCOUNTER — OFFICE VISIT (OUTPATIENT)
Dept: WOUND CARE | Facility: MEDICAL CENTER | Age: 80
End: 2025-07-17
Attending: NURSE PRACTITIONER
Payer: MEDICARE

## 2025-07-17 DIAGNOSIS — E10.65 UNCONTROLLED TYPE 1 DIABETES MELLITUS WITH HYPERGLYCEMIA (HCC): ICD-10-CM

## 2025-07-17 DIAGNOSIS — R52 PAIN ASSOCIATED WITH WOUND: ICD-10-CM

## 2025-07-17 DIAGNOSIS — T14.8XXA PAIN ASSOCIATED WITH WOUND: ICD-10-CM

## 2025-07-17 DIAGNOSIS — E10.621 DIABETIC ULCER OF RIGHT HEEL ASSOCIATED WITH TYPE 1 DIABETES MELLITUS, WITH FAT LAYER EXPOSED (HCC): Primary | ICD-10-CM

## 2025-07-17 DIAGNOSIS — L97.412 DIABETIC ULCER OF RIGHT HEEL ASSOCIATED WITH TYPE 1 DIABETES MELLITUS, WITH FAT LAYER EXPOSED (HCC): Primary | ICD-10-CM

## 2025-07-17 DIAGNOSIS — E10.42 DIABETIC POLYNEUROPATHY ASSOCIATED WITH TYPE 1 DIABETES MELLITUS (HCC): ICD-10-CM

## 2025-07-17 PROCEDURE — 11042 DBRDMT SUBQ TIS 1ST 20SQCM/<: CPT | Performed by: NURSE PRACTITIONER

## 2025-07-17 PROCEDURE — 99214 OFFICE O/P EST MOD 30 MIN: CPT | Mod: 25 | Performed by: NURSE PRACTITIONER

## 2025-07-17 PROCEDURE — 11042 DBRDMT SUBQ TIS 1ST 20SQCM/<: CPT

## 2025-07-17 PROCEDURE — 99214 OFFICE O/P EST MOD 30 MIN: CPT

## 2025-07-17 RX ORDER — MELOXICAM 15 MG/1
15 TABLET ORAL
COMMUNITY
Start: 2025-04-15

## 2025-07-17 RX ORDER — GABAPENTIN 100 MG/1
100 CAPSULE ORAL 2 TIMES DAILY
COMMUNITY

## 2025-07-17 ASSESSMENT — ENCOUNTER SYMPTOMS
CONSTIPATION: 0
VOMITING: 0
DIARRHEA: 0
SHORTNESS OF BREATH: 0
COUGH: 0
FEVER: 0
NAUSEA: 0
CHILLS: 0
CLAUDICATION: 0

## 2025-07-17 NOTE — PROGRESS NOTES
Advanced Wound Care   [facility]   [address]   [address]   [place] [address]   [phone] Fax: [phone]    DME: [facility]  Duration of Supply Order: 90 days  Dispense as written.    Wound 07/17/25 Diabetic Ulcer Heel Medial Right (Active)   Wound Image    07/17/25 0900   Site Assessment Pale;Pink 07/17/25 0900   Periwound Assessment Callused 07/17/25 0900   Margins Unattached edges 07/17/25 0900   Drainage Amount Small 07/17/25 0900   Drainage Description Serosanguineous 07/17/25 0900   Treatments Cleansed;Topical Lidocaine;Provider debridement;Site care 07/17/25 0900   Offloading/DME Other (comment) (wheel chair) 07/17/25 0900   Wound Cleansing Hypochlorus Acid 07/17/25 0900   Periwound Protectant No-sting Skin Prep 07/17/25 0900   Dressing Changed New 07/17/25 0900   Dressing Cleansing/Solutions Not Applicable 07/17/25 0900   Dressing Options Honey Colloid;Hypafix Tape 07/17/25 0900   Dressing Change/Treatment Frequency Twice Weekly 07/17/25 0900   Wound Team Following Weekly 07/17/25 0900   Non-staged Wound Description Full thickness 07/17/25 0900   Wound Length (cm) 0.4 cm 07/17/25 0900   Wound Width (cm) 0.9 cm 07/17/25 0900   Wound Depth (cm) 0.1 cm 07/17/25 0900   Wound Surface Area (cm^2) 0.28 cm^2 07/17/25 0900   Wound Volume (cm^3) 0.019 cm^3 07/17/25 0900   Post-Procedure Length (cm) 0.5 cm 07/17/25 0900   Post-Procedure Width (cm) 0.9 cm 07/17/25 0900   Post-Procedure Depth (cm) 0.1 cm 07/17/25 0900   Post-Procedure Surface Area (cm^2) 0.35 cm^2 07/17/25 0900   Post-Procedure Volume (cm^3) 0.024 cm^3 07/17/25 0900   Tunneling (cm) 0 cm 07/17/25 0900   Undermining (cm) 0 cm 07/17/25 0900   Wound Odor None 07/17/25 0900   Right Foot Monofilament 10-point exam (Sensate) 6/10 07/17/25 0900   Exposed Structures None 07/17/25 0900     PROCEDURE: Provider debridement using curette to remove nonviable tissue from wound bed(s). 2% topical Lidocaine applied prior to debridement  with ~5 minute dwell time. Patient tolerated well; denied pain.    ASSESSMENT AND PLAN:   Diabetic ulcer of right heel associated with type 1 diabetes mellitus, unspecified ulcer stage     Patient to change dressing 2 times per week. Cleanse wound(s) with saline and gauze. Patient/caregiver to apply dressing as instructed.     NOTE  Education on dressing change provided to pt and caregiver, Shannan. Also provided education of s/s of infection and when to go to ER.

## 2025-07-17 NOTE — PROGRESS NOTES
" Provider Encounter- Diabetic Foot Ulcer      HISTORY OF PRESENT ILLNESS  Wound History:    START OF CARE IN CLINIC: 7/17/2025    REFERRING PROVIDER: Krystal Polanco      WOUND- Diabetic foot ulcer   LOCATION: Right medial heel   HISTORY: 80-year-old female referred to the clinic for an ulcer to her right medial heel.  She states that she had had a fissure here for a long time that she usually applied moisturizer to.  However, in early June of this year she had a pedicure and the fissure evolved into an ulcer.  She has been using various treatments, including leaving open to air and applying simple bandages.  After no improvement, she was seen by her PCP, who prescribed Augmentin and referred her to [facility].    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  insulin.  Lispro and Tresiba  Checks blood sugars: >3x/day   Blood sugar average: 140-160  Has had previous diabetes education.    Does not have numbness in feet.    Foot wear:nonprescriptive shoes.  Sometimes checks feet routinely.    Previous foot ulcers: No   Previous foot surgery:no  Current occupation retired.    Offloading none.           TOBACCO USE:   She is never smoked or use smokeless tobacco    Patient's problem list, allergies, and current medications reviewed and updated in Epic    Interval History:  7/17/2025 Initial clinic visit with LOUISE Valencia, FNTHOMAS-BC, CWOCN, CFCN.   Patient presents today accompanied by her personal caregiver.  She states she is feeling well overall, denies fevers, chills, nausea, vomiting, cough or shortness of breath.  She reports her blood sugar today was 141.  She completed her antibiotics more than a week ago.   She presents today wearing open sandals.  She has moderately decreased sensation in her feet as evidenced by monofilament testing.  She was brought into the clinic in a " wheelchair, pushed by her caregiver.  She is able to walk short distances using a walker, however uses wheelchair for longer distances.      REVIEW OF SYSTEMS:   Review of Systems   Constitutional:  Negative for chills and fever.   Respiratory:  Negative for cough and shortness of breath.    Cardiovascular:  Negative for chest pain and claudication.   Gastrointestinal:  Negative for constipation, diarrhea, nausea and vomiting.   Genitourinary:  Negative for dysuria.       PHYSICAL EXAMINATION:   There were no vitals taken for this visit.    Physical Exam  Constitutional:       Appearance: Normal appearance. She is normal weight.   HENT:      Head:      Comments: Hard of hearing  Cardiovascular:      Pulses: Normal pulses.      Comments: Bilateral pedal pulses easily palpated, +2  Pulmonary:      Effort: Pulmonary effort is normal.   Musculoskeletal:      Right lower leg: No edema.      Left lower leg: No edema.   Skin:     Comments: Full-thickness wound to right medial heel: Initial assessment.  Thick slough to wound bed.  Minimal serosanguineous drainage, no odor.  No periwound erythema or induration.  Tender with palpation.   Neurological:      Mental Status: She is alert and oriented to person, place, and time.         Monofilament testing with a 10 gram force: sensation intact: decreased bilaterally  Visual Inspection: Feet without maceration, ulcers, fissures.  Pedal pulses: intact bilaterally     WOUND ASSESSMENT  Wound 07/17/25 Diabetic Ulcer Heel Medial Right (Active)   Wound Image    07/17/25 0900   Site Assessment Pale;Pink 07/17/25 0900   Periwound Assessment Callused 07/17/25 0900   Margins Unattached edges 07/17/25 0900   Drainage Amount Small 07/17/25 0900   Drainage Description Serosanguineous 07/17/25 0900   Treatments Cleansed;Topical Lidocaine;Provider debridement;Site care 07/17/25 0900   Offloading/DME Other (comment) 07/17/25 0900   Wound Cleansing Hypochlorus Acid 07/17/25 0900   Periwound  Protectant No-sting Skin Prep 07/17/25 0900   Dressing Changed New 07/17/25 0900   Dressing Cleansing/Solutions Not Applicable 07/17/25 0900   Dressing Options Honey Colloid;Hypafix Tape 07/17/25 0900   Dressing Change/Treatment Frequency Twice Weekly 07/17/25 0900   Wound Team Following Weekly 07/17/25 0900   Non-staged Wound Description Full thickness 07/17/25 0900   Wound Length (cm) 0.4 cm 07/17/25 0900   Wound Width (cm) 0.9 cm 07/17/25 0900   Wound Depth (cm) 0.1 cm 07/17/25 0900   Wound Surface Area (cm^2) 0.28 cm^2 07/17/25 0900   Wound Volume (cm^3) 0.019 cm^3 07/17/25 0900   Post-Procedure Length (cm) 0.5 cm 07/17/25 0900   Post-Procedure Width (cm) 0.9 cm 07/17/25 0900   Post-Procedure Depth (cm) 0.1 cm 07/17/25 0900   Post-Procedure Surface Area (cm^2) 0.35 cm^2 07/17/25 0900   Post-Procedure Volume (cm^3) 0.024 cm^3 07/17/25 0900   Tunneling (cm) 0 cm 07/17/25 0900   Undermining (cm) 0 cm 07/17/25 0900   Wound Odor None 07/17/25 0900   Right Foot Monofilament 10-point exam (Sensate) 6/10 07/17/25 0900   Exposed Structures None 07/17/25 0900   Number of days: 0         PROCEDURE: Excisional debridement of right medial heel ulcer and periwound callus  -2% viscous lidocaine applied topically to wound bed for approximately 5 minutes prior to debridement  -Curette used to debride wound bed and periwound callus.  Excisional debridement was performed to remove devitalized tissue until healthy, bleeding tissue was visualized.   Entire surface of wound, 0.35 cm² debrided.  Tissue debrided into the subcutaneous layer.  Periwound callus, ~1 cm. , debrided to skin level, excising hyperkeratinized tissue.   -Bleeding controlled with manual pressure.    -Wound care completed by wound RN, refer to flowsheet  -Patient tolerated the procedure well, without c/o pain or discomfort.       Pertinent Labs and Diagnostics:         Labs:     A1c:   Lab Results   Component Value Date/Time    HBA1C 7.4 (A) 03/09/2022 12:49 PM     "      IMAGING: No results found.    VASCULAR STUDIES: No results found.    LAST  WOUND CULTURE: No results found for: \"CULTRSULT\"        ASSESSMENT AND PLAN:     1. Diabetic ulcer of right heel associated with type 1 diabetes mellitus, with fat layer exposed ([facility])    7/17/2025: Patient had a fissure off-and-on  to this area for quite some time, was managing with moisturizer.  Developed an ulcer to have a pedicure  -Excisional debridement of wound in clinic today, medically necessary to promote wound healing.  -Patient to return to clinic weekly for assessment and debridement  - Patient's caregiver to change dressing 1-2 times per week in between clinic visits.  Instruction provided, leftover supplies given to patient  - Patient to keep dressing dry  - No evidence of infection.  Patient completed course of Augmentin approximately 10 days ago    Wound care: Honey colloid, Hypafix tape    2. Uncontrolled type 1 diabetes mellitus with hyperglycemia ([facility])    7/17/2025: Most recent A1c available in epic is from 2022.  Patient states she had 1 done recently and that it was around 8%.  - Will attempt to obtain most recent A1c results from patient's PCP office  - Patient reports her blood sugar today was around 141  - Adverse effects of hyperglycemia wound healing discussed with patient in clinic today.  Advised to keep blood sugars below 140 in order to optimize wound healing  - Patient is established with endocrinology    3. Diabetic polyneuropathy associated with type 1 diabetes mellitus ([facility])    7/17/2025: Monofilament testing in clinic indicates slightly decreased sensation.  Patient sensed 6/10 bilaterally  - Implications of loss of protective sensation (LOPS) discussed with patient- including increased risk for amputation.  Advised to check feet at least daily, moisturize feet, and to always wear protective foot wear.    - Patient advised to wear full shoe rather than sandals    4. Pain associated with " wound    7/17/2025:  -2% viscous lidocaine applied topically to wound bed for approximately 5 minutes prior to debridement  -Patient tolerated procedure today with no complaints of discomfort.           PATIENT EDUCATION  - Importance of tight glucose control for wound healing   - Implications of loss of protective sensation (LOPS) discussed with patient- including increased risk for amputation.  - Advised to check feet at least daily, moisturize feet, and to always wear protective foot wear.   -  Importance of offloading foot to assist with wound healing  - Advised pt not to trim nails or calluses, seek foot/nail care from podiatrist or certified foot/nail nurse  - Importance of adequate nutrition for wound healing    My total time spent caring for the patient on the day of the encounter was 30 minutes.   This does not include time spent on separately billable procedures/tests.       Please note that this note may have been created using voice recognition software. I have worked with technical experts from [facility] to optimize the interface.  I have made every reasonable attempt to correct obvious errors, but there may be errors of grammar and possibly content that I did not discover before finalizing the note.    N

## 2025-07-24 ENCOUNTER — NON-PROVIDER VISIT (OUTPATIENT)
Dept: WOUND CARE | Facility: MEDICAL CENTER | Age: 80
End: 2025-07-24
Attending: NURSE PRACTITIONER
Payer: MEDICARE

## 2025-07-24 PROCEDURE — 97597 DBRDMT OPN WND 1ST 20 CM/<: CPT

## 2025-07-24 PROCEDURE — 99213 OFFICE O/P EST LOW 20 MIN: CPT

## 2025-07-24 NOTE — PROGRESS NOTES
RN Wound Care Procedures 7/24/2025:  Viscous lidocaine 2% applied to the right medial heel wound for approximately 3 minutes prior to procedure.  Selective debridement with curette to remove ~0.2 cm² slough from the right medial heel wound. Patient tolerated the procedure well.      Patient's wound has increased adherent slough today, and there is increased periwound erythema. Patient attributes the periwound erythema to accidentally using a latex containing bandage over her wound last week.    Primary dressing changed to Iodosorb for its increased antimicrobial activity and to assist in breaking up adherent slough. Educated patient regarding rationale for dressing selections, dressing change procedure, and recommended frequency of dressing changes. Patient verbalized understanding of instructions.

## 2025-07-24 NOTE — PATIENT INSTRUCTIONS
Keep dressings clean and dry. Change dressings every 2-3 days, and if they become over saturated, soiled or fall off.     On the days you are not changing your dressings, avoid getting the dressings wet. It is not harmful to get your wound wet when you are washing your wound before applying a new dressing.    If you need to change your dressings at home: Wash your wound with normal saline, wound cleanser, or unscented soap and water prior to applying your new dressings. Please avoid cleansing with hydrogen peroxide or rubbing alcohol. Hydrogen peroxide and rubbing alcohol are toxic to new tissue and skin cells.    Avoid prolonged standing or sitting without elevating your legs.    Should you experience any significant changes in your wound, such as signs of infection (increasing redness, swelling, localized heat, increased pain, fever > 101 F, chills) or have any questions regarding your home care instructions, please contact the wound center at [phone]. If after hours, contact your primary care physician or go to the hospital emergency room.     If you are admitted to any hospital, you will need a new referral to come back to the wound clinic and any scheduled appointments that you already have, may be cancelled.     If you are 5 or more minutes late for an appointment, we reserve the right to cancel and reschedule that appointment. Additionally, if you are habitually late or not showing (3 late cancellations and/or no shows), we reserve the right to cancel your remaining appointments and it will be your responsibility to obtain a new referral if services are still needed.

## 2025-07-31 ENCOUNTER — NON-PROVIDER VISIT (OUTPATIENT)
Dept: WOUND CARE | Facility: MEDICAL CENTER | Age: 80
End: 2025-07-31
Attending: NURSE PRACTITIONER
Payer: MEDICARE

## 2025-07-31 PROCEDURE — 99213 OFFICE O/P EST LOW 20 MIN: CPT

## 2025-07-31 NOTE — PROGRESS NOTES
Wound(s) cleansed, assessed, and photo(s) taken for chart. Hypochlorous Acid soak to wound bed(s) allowed to dwell for approximately 3 minutes.   Topical Lidocaine 2% gel applied to wound bed allowed to dwell 5-10 mins prior to cleansing. Trimmed argelia wound loose nonviable epidermal skin with scissors.   Education provided regarding normal wound healing and products utilized this visit including reasons for use, application, how to clean wound, and recommended frequency/plan for dressing changes. Left over dressing supplies used this visit given to patient for home use.  Gave patient handout for offloading shoes/inserts. Recommended checking  for options.  Discussed signs/symptoms of infection and to see medical care if signs develop.    After visit summary includes education regarding dressing changes, general nutrition needs, blood sugar control, and basic wound care.   Patient encouraged to ask questions and verbalized understanding of all teaching.

## 2025-07-31 NOTE — PATIENT INSTRUCTIONS
"The following information is a summary of the education provided in the clinic today. This is not an exhaustive list of the education provided during your appointment.       DRESSING CHANGES    Keep your wound dressing clean, dry, and intact. Change your dressing every 3 days AND/OR if the dressing becomes soiled, leaks, gets wet, or falls off.      You may not shower with the dressing(s) off. Please do not take baths, or swim in the ocean, lakes, rivers, pools, or hot tubs.      Wounds do not need to \"air out\" or \"breathe\". Gently dry your wound before placing a new dressing.     After you get out of the shower, wash the wound a second time (with soap and water, wound cleanser, or saline). Gently dry the wound before you place a new dressing.       If you need to change your dressings at home, you should wash your wound. Use normal saline, wound cleanser, hypochlorous acid, or unscented soap and water. Do not use hydrogen peroxide or rubbing alcohol to clean your wounds. Hydrogen peroxide and rubbing alcohol will damage new cells and tissue. Do not use betadine or iodine unless told to by your wound care team.    Do not soak your wounds in epsom salt baths. This can worsen your wound(s) or delay wound healing. It can also lead to infection or maceration (tissue is too wet).     If you do not have home health, the clinic will give you with leftover supplies from your appointment. We do not give out extra dressing supplies. We will order you supplies through a [employer] company. Your insurance may or may not pay for all these supplies. The company will reach out to you if insurance does not cover supplies. These supplies will be sent to your home within a few days. If you do have home health, they will provide wound care supplies.     The dressings we use may change as your wound changes.     GENERAL HEALTH ADVICE   -High blood sugar, like with diabetes, can delay or reverse wound healing by impacting your immune system. " It also increases the chances of infection. Wounds heal best when your blood sugar is consistently less than 140 mg/dL. It is important to check your blood sugar regularly.      -Nutrition is important for wound healing. Unless told otherwise by your doctor, eat more protein and consider supplementing with a multi-vitamin, zinc, and vitamin C.       OFFLOADING  -Offloading is important as it takes pressure off your wound. Reduced pressure helps wounds heal. Wear your Offloading shoe every time you are up and walking, even when in your own home. Wounds that are not correctly offloaded may get worse or be slow to heal.     -Offloading cushions help relieve pressure from some wounds. You were given a handout today that explains the different types of cushions and where to buy them.     CLINIC INFORMATION  The clinic's hours are Monday-Friday, 7:30 AM to 5:00 PM. We are closed most holidays and on weekends. If you leave us a message, please allow 24 hours for someone to return your call. If you have concerns or are having a medical emergency, call 911 or go to the hospital emergency room.     You might not see the same nurse or provider every visit.     If you notice any large changes in your wound(s), or signs of infection (redness, swelling, localized heat, increased pain, fever > 101 F, chills, nausea/vomiting) or have any questions about your home care instructions, please call the wound center at [phone]. If it's after hours, contact your primary care physician or go to the hospital emergency room. If you are admitted to any hospital, you will need a new referral to come back to the wound clinic. Any wound care appointments that you already have may be cancelled.    If you are 5 or more minutes late for an appointment, we reserve the right to cancel and reschedule that appointment. For example, if your appointment is at 1:00 PM, and you arrive at 1:06 PM, you are more than five minutes late and might not be  seen. If you are consistently late or not coming to your appointments (typically 3 late cancellations and/or no shows), we reserve the right to cancel your future appointments or discharge you from the clinic. It is then your responsibility to obtain a new referral if wound care is still needed.

## 2025-08-07 ENCOUNTER — NON-PROVIDER VISIT (OUTPATIENT)
Dept: WOUND CARE | Facility: MEDICAL CENTER | Age: 80
End: 2025-08-07
Attending: NURSE PRACTITIONER
Payer: MEDICARE

## 2025-08-07 PROCEDURE — 99213 OFFICE O/P EST LOW 20 MIN: CPT

## 2025-08-07 PROCEDURE — 97597 DBRDMT OPN WND 1ST 20 CM/<: CPT

## 2025-08-12 ENCOUNTER — TELEPHONE (OUTPATIENT)
Dept: PHYSICAL THERAPY | Facility: MEDICAL CENTER | Age: 80
End: 2025-08-12
Payer: MEDICARE

## 2025-08-14 ENCOUNTER — NON-PROVIDER VISIT (OUTPATIENT)
Dept: WOUND CARE | Facility: MEDICAL CENTER | Age: 80
End: 2025-08-14
Attending: NURSE PRACTITIONER
Payer: MEDICARE

## 2025-08-14 PROCEDURE — 99213 OFFICE O/P EST LOW 20 MIN: CPT

## 2025-08-14 PROCEDURE — 97602 WOUND(S) CARE NON-SELECTIVE: CPT

## 2025-08-21 ENCOUNTER — OFFICE VISIT (OUTPATIENT)
Dept: WOUND CARE | Facility: MEDICAL CENTER | Age: 80
End: 2025-08-21
Attending: NURSE PRACTITIONER
Payer: MEDICARE

## 2025-08-21 DIAGNOSIS — E10.621 DIABETIC ULCER OF RIGHT HEEL ASSOCIATED WITH TYPE 1 DIABETES MELLITUS, WITH FAT LAYER EXPOSED (HCC): Primary | ICD-10-CM

## 2025-08-21 DIAGNOSIS — R52 PAIN ASSOCIATED WITH WOUND: ICD-10-CM

## 2025-08-21 DIAGNOSIS — E10.621 DIABETIC ULCER OF TOE OF LEFT FOOT ASSOCIATED WITH TYPE 1 DIABETES MELLITUS, WITH FAT LAYER EXPOSED (HCC): ICD-10-CM

## 2025-08-21 DIAGNOSIS — L97.522 DIABETIC ULCER OF TOE OF LEFT FOOT ASSOCIATED WITH TYPE 1 DIABETES MELLITUS, WITH FAT LAYER EXPOSED (HCC): ICD-10-CM

## 2025-08-21 DIAGNOSIS — L97.412 DIABETIC ULCER OF RIGHT HEEL ASSOCIATED WITH TYPE 1 DIABETES MELLITUS, WITH FAT LAYER EXPOSED (HCC): Primary | ICD-10-CM

## 2025-08-21 DIAGNOSIS — E10.42 DIABETIC POLYNEUROPATHY ASSOCIATED WITH TYPE 1 DIABETES MELLITUS (HCC): ICD-10-CM

## 2025-08-21 DIAGNOSIS — E10.65 UNCONTROLLED TYPE 1 DIABETES MELLITUS WITH HYPERGLYCEMIA (HCC): ICD-10-CM

## 2025-08-21 DIAGNOSIS — T14.8XXA PAIN ASSOCIATED WITH WOUND: ICD-10-CM

## 2025-08-21 PROCEDURE — 11042 DBRDMT SUBQ TIS 1ST 20SQCM/<: CPT | Performed by: NURSE PRACTITIONER

## 2025-08-21 PROCEDURE — 99213 OFFICE O/P EST LOW 20 MIN: CPT | Mod: 25 | Performed by: NURSE PRACTITIONER

## 2025-08-21 PROCEDURE — 99213 OFFICE O/P EST LOW 20 MIN: CPT

## 2025-08-21 PROCEDURE — 11042 DBRDMT SUBQ TIS 1ST 20SQCM/<: CPT

## 2025-08-28 ENCOUNTER — NON-PROVIDER VISIT (OUTPATIENT)
Dept: WOUND CARE | Facility: MEDICAL CENTER | Age: 80
End: 2025-08-28
Attending: NURSE PRACTITIONER
Payer: MEDICARE

## 2025-08-28 PROCEDURE — 99213 OFFICE O/P EST LOW 20 MIN: CPT

## 2025-08-28 PROCEDURE — 97597 DBRDMT OPN WND 1ST 20 CM/<: CPT
